# Patient Record
Sex: MALE | Race: WHITE | Employment: UNEMPLOYED | URBAN - METROPOLITAN AREA
[De-identification: names, ages, dates, MRNs, and addresses within clinical notes are randomized per-mention and may not be internally consistent; named-entity substitution may affect disease eponyms.]

---

## 2019-03-10 ENCOUNTER — TRANSFERRED RECORDS (OUTPATIENT)
Dept: HEALTH INFORMATION MANAGEMENT | Facility: CLINIC | Age: 54
End: 2019-03-10

## 2019-03-19 ENCOUNTER — OFFICE VISIT (OUTPATIENT)
Dept: NEUROLOGY | Facility: CLINIC | Age: 54
End: 2019-03-19
Payer: COMMERCIAL

## 2019-03-19 ENCOUNTER — ALLIED HEALTH/NURSE VISIT (OUTPATIENT)
Dept: NEUROLOGY | Facility: CLINIC | Age: 54
End: 2019-03-19
Payer: COMMERCIAL

## 2019-03-19 ENCOUNTER — TRANSFERRED RECORDS (OUTPATIENT)
Dept: HEALTH INFORMATION MANAGEMENT | Facility: CLINIC | Age: 54
End: 2019-03-19

## 2019-03-19 VITALS
TEMPERATURE: 98.2 F | SYSTOLIC BLOOD PRESSURE: 136 MMHG | WEIGHT: 226 LBS | HEART RATE: 71 BPM | DIASTOLIC BLOOD PRESSURE: 88 MMHG

## 2019-03-19 DIAGNOSIS — G40.909 EPILEPTIC SEIZURE (H): Primary | ICD-10-CM

## 2019-03-19 DIAGNOSIS — G40.219 PARTIAL SYMPTOMATIC EPILEPSY WITH COMPLEX PARTIAL SEIZURES, INTRACTABLE, WITHOUT STATUS EPILEPTICUS (H): Primary | ICD-10-CM

## 2019-03-19 RX ORDER — MELOXICAM 7.5 MG/1
7.5 TABLET ORAL
Status: ON HOLD | COMMUNITY
Start: 2019-01-22 | End: 2019-03-21

## 2019-03-19 RX ORDER — LAMOTRIGINE 100 MG/1
TABLET ORAL
COMMUNITY
Start: 2018-10-09 | End: 2019-11-26

## 2019-03-19 RX ORDER — LEVETIRACETAM 500 MG/1
750 TABLET ORAL 2 TIMES DAILY
Status: ON HOLD | COMMUNITY
Start: 2018-04-24 | End: 2019-03-28

## 2019-03-19 ASSESSMENT — PAIN SCALES - GENERAL: PAINLEVEL: SEVERE PAIN (7)

## 2019-03-19 NOTE — PROCEDURES
CPT 52620-87  OP/3hr Video EEG  [facility]
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
## 2019-03-19 NOTE — PATIENT INSTRUCTIONS
Please review sleep apnea machine with Dr. Lobito Young MD. Untreated sleep apnea can cause memory issues, early morning headaches.     Slowly reduce caffeine intake, if you drink too much caffeine it can cause seizure and be harmful to your heart.   Make small changes every two months, focus on one small change every month. Drink last caffeine drink at 5 pm.     Wear eye mask to reduce TV light at night, to improve sleep.      In the hospital we will check labs for sleepiness, EEG testing to look for seizures, and MRI brain     Follow up  Call with Hector in 1 month      Charity Young MD
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
## 2019-03-19 NOTE — PROGRESS NOTES
Called an left message to call [facility] about his EEG. There was no answer. Charity Young MD 3/19/2019 at 5 pm  Reviewed his EEG on 3/20/2019 with patient and advised him not to drive. Charity Young MD
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
## 2019-03-20 ENCOUNTER — ALLIED HEALTH/NURSE VISIT (OUTPATIENT)
Dept: NEUROLOGY | Facility: CLINIC | Age: 54
End: 2019-03-20
Payer: COMMERCIAL

## 2019-03-20 ENCOUNTER — OFFICE VISIT (OUTPATIENT)
Dept: NEUROLOGY | Facility: CLINIC | Age: 54
End: 2019-03-20
Payer: COMMERCIAL

## 2019-03-20 DIAGNOSIS — R41.3 MEMORY LOSS: ICD-10-CM

## 2019-03-20 DIAGNOSIS — F09 MENTAL DISORDER DUE TO GENERAL MEDICAL CONDITION: ICD-10-CM

## 2019-03-20 DIAGNOSIS — G40.919 EPILEPSY WITH ALTERED CONSCIOUSNESS WITH INTRACTABLE EPILEPSY (H): Primary | ICD-10-CM

## 2019-03-20 DIAGNOSIS — G40.219 PARTIAL SYMPTOMATIC EPILEPSY WITH COMPLEX PARTIAL SEIZURES, INTRACTABLE, WITHOUT STATUS EPILEPTICUS (H): Primary | ICD-10-CM

## 2019-03-20 NOTE — PROGRESS NOTES
"Derrell Salguero comes into clinic today at the request of Dr. Young Ordering Provider for General/Baseline/Admission.    This service provided today was under the supervising provider of the day Dr. Young, who was available if needed.  Debra Pruett RNCC    Care Coordinator Visit    Name:  Derrell Salguero   Date:    3/20/2019   :   1965   MRN:  [id number]     Time Spent:  Face-to-face time 55 minutes  See scanned [employer] checklist regarding videos viewed and handouts provided.     I met with the patient  after seizure videos were viewed, as listed in the checklist.     IMPRESSION:   1.  Epilepsy, unspecified.   2.  Memory loss, etiology unclear.       DISCUSSION:  The patient presents with declining memory over the last several years.  He is not having obvious clinical seizures at night.  However, early in the morning, wife notices that he appears to have a post-seizure \"look about him. \"  It would be helpful to do video EEG monitoring to evaluate seizure burden to determine if he has subclinical seizures, which may gradually worsen his seizures and his memory over several years.  Additionally, the patient does not use a pillbox, so this certainly raises the concern if he misses his medications due to his poor memory.  His wife works and the patient is responsible for his meds.  It would be helpful to have our nurses talk to him about medication compliance by using a pillbox and phone alarm.        Additionally, for memory deficits, we should complete basic tests including vitamin D, TSH, testosterone, MMA, vitamin B12, and anti-seizure medication levels.  We should also look at hemoglobin A1c as he feels sometimes his blood sugar drops and he gets hypoglycemic.  We should repeat MRI of the brain since patient is having memory deficit issues and lastly, we will admit him to the hospital to understand interictal and ictal burden if he has subclinical seizures.  During this admission, on the " first night, I would recommend not decreasing his anti-seizure medications.  We may sleep deprive him and then see if he has spontaneous partial seizures.      PLAN:   1.  Admit to the hospital for video EEG monitoring.  On day 1, do not reduce seizure medications just sleep deprive him.   2.  Neuropsychological testing to evaluate memory.  There is concern he may have subclinical seizures resulting in memory decline.   3.  MRI of the brain.   4.  Nurse education on hospital admission.   5.  If patient is found to have a high seizure burden in the hospital, consideration should be given to optimizing levetiracetam and lamotrigine.  We may add on a third agent if need be.  Consideration may be given to lamotrigine, Neurontin, I would avoid topiramate and zonisamide because he has a history of kidney stones, Gabitril and Felbamate.   6.  Follow up with Dr. Young after hospital discharge.     The  What Do You Know About Epilepsy  questionnaire was reviewed with the patient.  Areas focused on were what to do if a medication dose is missed, the difference between generalized and partial seizures and healthy lifestyle choices.  We also reviewed all questions that were answered incorrectly.    Basic introduction to epilepsy was done, including the difference between epileptic and nonepileptic events (including physiologic and psychogenic nonepileptic events) and the difference between partial onset and generalized onset epileptic seizures.  We discussed appropriate treatment for each of these and discussed why an accurate diagnosis is important.      We also discussed the importance of adequate sleep and good sleep hygiene, maintaining a healthy diet, taking a multivitamin, and getting exercise.  We discussed that the consumption of alcohol will affect how the body metabolizes medications and that binge drinking can cause withdrawal seizures.  We discussed stress and illness and how these can affect seizure control.  We  discussed how over-the counter diet aids, energy drinks, caffeine, and some herbal supplements can affect seizure control.  We also discussed taking showers instead of baths and keeping the shower drain clear so as not to allow water to pool.  We discussed driving. The patient understands that he is responsible for knowing and understanding his  state driving laws as well as for reporting any loss of contact or voluntary control to the Department of Motor Vehicles.  Derrell  also understands the liabilities and risks related to driving.    Introduction to [facility] was performed, including hospital policies, how seizures are induced, hygiene breaks, and the importance of staff assistance whenever  he  gets out of bed.  Derrell is aware that the length of stay is generally five days, but that this is dependent on what is captured on EEG.     During our general education session Derrell asked many appropriate questions, which were answered to  his satisfaction.
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
## 2019-03-20 NOTE — PROCEDURES
Procedure Date: 03/19/2019      EEG #:  [id number].        Three-hour EEG on 03/19/2019.      SOURCE FILE DURATION:  2 hours 54 minutes.      PATIENT INFORMATION:  This is a 53-year-old male with a history of epilepsy who presents with increased memory loss.  EEG is being done to evaluate for seizures.      MEDICATIONS:  Lamotrigine 250 mg twice a day and levetiracetam 750 mg twice a day.      TECHNICAL SUMMARY: This video EEG monitoring procedure was performed with 23 scalp electrodes in 10-20 system placements, and additional scalp, precordial and other surface electrodes used for electrical referencing and artifact detection. Video was reviewed intermittently by EEG technologist and physician for electroclinical seizures.     BACKGROUND ACTIVITY:  During wakefulness, the background activity consists of synchronous and symmetric, well modulated, 9 Hz posterior dominant rhythm. The posterior dominant rhythm attenuated with eye opening. During drowsiness, the background activity waxed and waned and there were periods of slowing and attenuation of the posterior alpha rhythm. Stage I sleep and Stage II sleep was recorded in which synchronous and symmetrical vertex waves , K-complexes and sleep spindles  were identified. After seizures there is right temporal chain postictal delta-theta slowing and asymmetric sleep architecture with decreased amplitude noted in the right hemisphere.  Good examples of this is at 13:45 and the postictal slowing is best seen at 13:42, maximally involving the frontotemporal region.      EPILEPTIFORM DISCHARGES:  The patient has epileptiform discharges in the right frontal region with maximum negativity at F8 followed by T4.  Good examples of these discharges are at 13:30.  Preceding his seizures, he does have an increased burden of epileptiform discharges seen at 13:31.  This builds up to an electrographic seizure at 13:38.  In rare instances, we do see sharp waves in the left temporal  "region.  A good example of this is at 13:42:11.  Maximum negativity seen at T3.      ICTAL:  The patient had 3 electroclinical seizures on this day.  All 3 seizures arose from sleep and clinically the times of the seizure are 13:38, 14:28 and 15:27.  The seizures arise out of sleep and the patient electrographically has right temporal lobe theta ictal discharge that lasts under 1 minute.  Onset is best seen in the right temporal chain with a rhythmic theta discharge that evolves to a higher voltage delta discharge with propagation to the right parasagittal chain and the midline electrodes.  A well-formed left hemispheric seizure was not seen.  Postictally, there is right frontotemporal slowing.  Clinically, the seizures at 13:33 and 14:28, the patient is sleeping.  He does have some mouth movements and appears to be clenching his jaw.  There was no obvious hand automatisms or motor movements identified on the video.  The best seizure testing is at 15:27.  Again, this seizure arose out of sleep.  The patient was promptly tested and he was able to follow commands and identify his name.  The patient stated after the seizure, he had a tingling feeling in his head.  Postictally, the patient was not able to recall the commands he was given.  He was able to read and when asked what he felt during that particular spell, he states he felt tingling in his head and sometimes all over.  He is not able to identify how long it lasted.  When asked if he had a seizure, he states \"I have no idea.\"  When asked when was his last seizure, he states \"a long time ago.\"      IMPRESSION:  Awake and sleep EEG is abnormal due to the presence of 3 electroclinical seizures.  Clinically, seizure arose out of sleep, he reported a sensation of tingling in his head/body, and had oral automatisms. He was able to follow commands and had no obvious impairment in awareness. Additionally, the interictal EEG does have right frontotemporal lobe " epileptiform discharges with focal postictal right frontotemporal slowing.  In rare instances, we do see left temporal lobe voltage epileptiform discharges. EEG is consistent with increased cortical irritability in bitemporal region (right greater than left) and post ictal focal right temporal lobe cortical dysfunction. EEG is diagnostic for a right temporal lobe epilepsy.   Clinical correlation is advised.         JAMIE DELGADILLO MD             D: 2019   T: 2019   MT: vern      Name:     MARIPOSA ANDERSEN   MRN:      [id number]        Account:        [id number]   :      1965           Procedure Date: 2019      Document: [id number]

## 2019-03-20 NOTE — PROGRESS NOTES
Patient was seen for neuropsychological evaluation at the request of Dr. Charity Young, for the purposes of diagnostic clarification and treatment planning.  2 hrs 49 min of test administration and scoring were provided by this writer, Debra Choudhury.  Please see Dr. Rafael Clayton's report for a full interpretation of the findings.

## 2019-03-21 ENCOUNTER — ALLIED HEALTH/NURSE VISIT (OUTPATIENT)
Dept: NEUROLOGY | Facility: CLINIC | Age: 54
End: 2019-03-21
Attending: PSYCHIATRY & NEUROLOGY
Payer: COMMERCIAL

## 2019-03-21 ENCOUNTER — HOSPITAL ENCOUNTER (INPATIENT)
Facility: CLINIC | Age: 54
LOS: 8 days | Discharge: HOME | End: 2019-03-29
Attending: PSYCHIATRY & NEUROLOGY | Admitting: PSYCHIATRY & NEUROLOGY
Payer: COMMERCIAL

## 2019-03-21 DIAGNOSIS — F41.9 ANXIETY: ICD-10-CM

## 2019-03-21 DIAGNOSIS — G40.219 PARTIAL SYMPTOMATIC EPILEPSY WITH COMPLEX PARTIAL SEIZURES, INTRACTABLE, WITHOUT STATUS EPILEPTICUS (H): Primary | ICD-10-CM

## 2019-03-21 DIAGNOSIS — G47.9 SLEEP DIFFICULTIES: ICD-10-CM

## 2019-03-21 LAB
ALBUMIN SERPL-MCNC: 3.8 G/DL (ref 3.4–5)
ALP SERPL-CCNC: 102 U/L (ref 40–150)
ALT SERPL W P-5'-P-CCNC: 22 U/L (ref 0–70)
ANION GAP SERPL CALCULATED.3IONS-SCNC: 5 MMOL/L (ref 3–14)
AST SERPL W P-5'-P-CCNC: 11 U/L (ref 0–45)
BILIRUB SERPL-MCNC: 0.9 MG/DL (ref 0.2–1.3)
BUN SERPL-MCNC: 18 MG/DL (ref 7–30)
CALCIUM SERPL-MCNC: 9.7 MG/DL (ref 8.5–10.1)
CHLORIDE SERPL-SCNC: 107 MMOL/L (ref 94–109)
CO2 SERPL-SCNC: 30 MMOL/L (ref 20–32)
CREAT SERPL-MCNC: 1.44 MG/DL (ref 0.66–1.25)
ERYTHROCYTE [DISTWIDTH] IN BLOOD BY AUTOMATED COUNT: 12.6 % (ref 10–15)
GFR SERPL CREATININE-BSD FRML MDRD: 54 ML/MIN/{1.73_M2}
GLUCOSE SERPL-MCNC: 88 MG/DL (ref 70–99)
HCT VFR BLD AUTO: 45.5 % (ref 40–53)
HGB BLD-MCNC: 14.2 G/DL (ref 13.3–17.7)
MCH RBC QN AUTO: 29.8 PG (ref 26.5–33)
MCHC RBC AUTO-ENTMCNC: 31.2 G/DL (ref 31.5–36.5)
MCV RBC AUTO: 96 FL (ref 78–100)
PLATELET # BLD AUTO: 265 10E9/L (ref 150–450)
POTASSIUM SERPL-SCNC: 4.8 MMOL/L (ref 3.4–5.3)
PROT SERPL-MCNC: 7.1 G/DL (ref 6.8–8.8)
RBC # BLD AUTO: 4.76 10E12/L (ref 4.4–5.9)
SODIUM SERPL-SCNC: 143 MMOL/L (ref 133–144)
WBC # BLD AUTO: 6 10E9/L (ref 4–11)

## 2019-03-21 PROCEDURE — 40000141 ZZH STATISTIC PERIPHERAL IV START W/O US GUIDANCE

## 2019-03-21 PROCEDURE — 85027 COMPLETE CBC AUTOMATED: CPT | Performed by: PHYSICIAN ASSISTANT

## 2019-03-21 PROCEDURE — 80175 DRUG SCREEN QUAN LAMOTRIGINE: CPT | Performed by: PHYSICIAN ASSISTANT

## 2019-03-21 PROCEDURE — 95951 ZZHC EEG VIDEO EACH 24 HR: CPT | Mod: ZF

## 2019-03-21 PROCEDURE — 36415 COLL VENOUS BLD VENIPUNCTURE: CPT | Performed by: PHYSICIAN ASSISTANT

## 2019-03-21 PROCEDURE — 80177 DRUG SCRN QUAN LEVETIRACETAM: CPT | Performed by: PHYSICIAN ASSISTANT

## 2019-03-21 PROCEDURE — 12000001 ZZH R&B MED SURG/OB UMMC

## 2019-03-21 PROCEDURE — 25000132 ZZH RX MED GY IP 250 OP 250 PS 637: Performed by: PHYSICIAN ASSISTANT

## 2019-03-21 PROCEDURE — 40000802 ZZH SITE CHECK

## 2019-03-21 PROCEDURE — 80053 COMPREHEN METABOLIC PANEL: CPT | Performed by: PHYSICIAN ASSISTANT

## 2019-03-21 PROCEDURE — 95951 ZZHC EEG VIDEO < 12 HR: CPT | Mod: 52,ZF

## 2019-03-21 RX ORDER — GINSENG 100 MG
CAPSULE ORAL 3 TIMES DAILY PRN
Status: DISCONTINUED | OUTPATIENT
Start: 2019-03-21 | End: 2019-03-29 | Stop reason: HOSPADM

## 2019-03-21 RX ORDER — LIDOCAINE 40 MG/G
CREAM TOPICAL
Status: DISCONTINUED | OUTPATIENT
Start: 2019-03-21 | End: 2019-03-29 | Stop reason: HOSPADM

## 2019-03-21 RX ORDER — DOCUSATE SODIUM 100 MG/1
100 CAPSULE, LIQUID FILLED ORAL 2 TIMES DAILY PRN
Status: DISCONTINUED | OUTPATIENT
Start: 2019-03-21 | End: 2019-03-29 | Stop reason: HOSPADM

## 2019-03-21 RX ORDER — ACETAMINOPHEN 325 MG/1
650 TABLET ORAL EVERY 4 HOURS PRN
Status: DISCONTINUED | OUTPATIENT
Start: 2019-03-21 | End: 2019-03-29 | Stop reason: HOSPADM

## 2019-03-21 RX ORDER — LORAZEPAM 2 MG/ML
2 INJECTION INTRAMUSCULAR
Status: DISCONTINUED | OUTPATIENT
Start: 2019-03-21 | End: 2019-03-29 | Stop reason: HOSPADM

## 2019-03-21 RX ORDER — IBUPROFEN 200 MG
200 TABLET ORAL EVERY 6 HOURS PRN
Status: DISCONTINUED | OUTPATIENT
Start: 2019-03-21 | End: 2019-03-29 | Stop reason: HOSPADM

## 2019-03-21 RX ORDER — LEVETIRACETAM 750 MG/1
750 TABLET ORAL 2 TIMES DAILY
Status: DISCONTINUED | OUTPATIENT
Start: 2019-03-21 | End: 2019-03-22

## 2019-03-21 RX ADMIN — LEVETIRACETAM 750 MG: 750 TABLET, FILM COATED ORAL at 21:28

## 2019-03-21 RX ADMIN — ACETAMINOPHEN 650 MG: 325 TABLET, FILM COATED ORAL at 21:32

## 2019-03-21 RX ADMIN — LAMOTRIGINE 250 MG: 150 TABLET ORAL at 21:28

## 2019-03-21 RX ADMIN — OMEPRAZOLE 20 MG: 20 CAPSULE, DELAYED RELEASE ORAL at 16:46

## 2019-03-21 ASSESSMENT — ACTIVITIES OF DAILY LIVING (ADL)
ADLS_ACUITY_SCORE: 20
ADLS_ACUITY_SCORE: 18

## 2019-03-21 ASSESSMENT — VISUAL ACUITY
OU: NORMAL ACUITY
OU: NORMAL ACUITY
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
## 2019-03-22 ENCOUNTER — ALLIED HEALTH/NURSE VISIT (OUTPATIENT)
Dept: NEUROLOGY | Facility: CLINIC | Age: 54
End: 2019-03-22
Attending: PSYCHIATRY & NEUROLOGY
Payer: COMMERCIAL

## 2019-03-22 DIAGNOSIS — G40.219 PARTIAL SYMPTOMATIC EPILEPSY WITH COMPLEX PARTIAL SEIZURES, INTRACTABLE, WITHOUT STATUS EPILEPTICUS (H): Primary | ICD-10-CM

## 2019-03-22 LAB
LAMOTRIGINE SERPL-MCNC: 6.3 UG/ML (ref 2.5–15)
LEVETIRACETAM SERPL-MCNC: 16 UG/ML (ref 12–46)

## 2019-03-22 PROCEDURE — 25000132 ZZH RX MED GY IP 250 OP 250 PS 637: Performed by: PHYSICIAN ASSISTANT

## 2019-03-22 PROCEDURE — 25000132 ZZH RX MED GY IP 250 OP 250 PS 637: Performed by: STUDENT IN AN ORGANIZED HEALTH CARE EDUCATION/TRAINING PROGRAM

## 2019-03-22 PROCEDURE — 95951 ZZHC EEG VIDEO EACH 24 HR: CPT | Mod: ZF

## 2019-03-22 PROCEDURE — 12000001 ZZH R&B MED SURG/OB UMMC

## 2019-03-22 RX ORDER — LANOLIN ALCOHOL/MO/W.PET/CERES
3 CREAM (GRAM) TOPICAL
Status: DISCONTINUED | OUTPATIENT
Start: 2019-03-22 | End: 2019-03-29 | Stop reason: HOSPADM

## 2019-03-22 RX ORDER — LEVETIRACETAM 500 MG/1
500 TABLET ORAL 2 TIMES DAILY
Status: DISCONTINUED | OUTPATIENT
Start: 2019-03-22 | End: 2019-03-24

## 2019-03-22 RX ADMIN — LEVETIRACETAM 500 MG: 500 TABLET, FILM COATED ORAL at 20:22

## 2019-03-22 RX ADMIN — LAMOTRIGINE 250 MG: 150 TABLET ORAL at 20:22

## 2019-03-22 RX ADMIN — LEVETIRACETAM 750 MG: 750 TABLET, FILM COATED ORAL at 08:05

## 2019-03-22 RX ADMIN — OMEPRAZOLE 20 MG: 20 CAPSULE, DELAYED RELEASE ORAL at 06:42

## 2019-03-22 RX ADMIN — LAMOTRIGINE 250 MG: 150 TABLET ORAL at 08:05

## 2019-03-22 RX ADMIN — MELATONIN TAB 3 MG 3 MG: 3 TAB at 21:27

## 2019-03-22 RX ADMIN — OMEPRAZOLE 20 MG: 20 CAPSULE, DELAYED RELEASE ORAL at 15:50

## 2019-03-22 ASSESSMENT — ACTIVITIES OF DAILY LIVING (ADL)
COGNITION: 0 - NO COGNITION ISSUES REPORTED
NUMBER_OF_TIMES_PATIENT_HAS_FALLEN_WITHIN_LAST_SIX_MONTHS: 1
SWALLOWING: 0-->SWALLOWS FOODS/LIQUIDS WITHOUT DIFFICULTY
ADLS_ACUITY_SCORE: 15
FALL_HISTORY_WITHIN_LAST_SIX_MONTHS: YES
AMBULATION: 0-->INDEPENDENT
BATHING: 0-->INDEPENDENT
ADLS_ACUITY_SCORE: 20
TRANSFERRING: 0-->INDEPENDENT
ADLS_ACUITY_SCORE: 19
ADLS_ACUITY_SCORE: 14
ADLS_ACUITY_SCORE: 19
WHICH_OF_THE_ABOVE_FUNCTIONAL_RISKS_HAD_A_RECENT_ONSET_OR_CHANGE?: AMBULATION
TOILETING: 0-->INDEPENDENT
ADLS_ACUITY_SCORE: 19
RETIRED_EATING: 0-->INDEPENDENT
RETIRED_COMMUNICATION: 0-->UNDERSTANDS/COMMUNICATES WITHOUT DIFFICULTY
DRESS: 0-->INDEPENDENT

## 2019-03-22 ASSESSMENT — VISUAL ACUITY: OU: NORMAL ACUITY
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
## 2019-03-22 NOTE — PROGRESS NOTES
[facility], [place]   Epilepsy Service Daily Note      Interval History:   Had one episode of tingling around eyes yesterday. No major complaints.     Review of System:   No nausea, No vomiting, no headaches, no dizziness, no chest pain.     Medications:   Antiepileptic Medications Home Doses: levetiracetam 750-750, lamotrigine 250-250  Antiepileptic Medications Current Doses:  levetiracetam 750-750, lamotrigine 250-250    Exam: Blood pressure 101/67, temperature 97.9  F (36.6  C), temperature source Oral, resp. rate 18, SpO2 98 %.   General: NAD  Neuro: Alert and oriented. Speech fluent. Hearing intact to normal conversation. EOM's intact. Face symmetric.     EEG: right temporal sharps     Assessment and Plan:   1. Patient is a 53 year old, right-handed male with a history of crohn's and focal epilepsy who is being admitted for quantification of seizures and further medication management. One target seizure recorded. There was no associated EEG correlate but scalp negative focal unimpaired seizure can not be ruled out.     -continue vEEG monitoring  -decrease levetiracetam to 500-500    Candice Fernandez PA-C    Type of target event identified: event with no loss of awareness, convulsion   Number of events: more needed, 1  Discharge medication plan: To be decided  Further Imaging studies needed prior to discharge: No imaging required prior to discharge  Discharge transportation: not discussed  Other pertinent issues/goals for discharge: not at this time       Total time: 15 minute was spent in the care of this patient. The patient agrees with the above mentioned plan of care. I answered all the patient's questions and addressed immediate concerns. More than 50% of time spent consisted of counseling and coordinating care, including discussion of the diagnostic significance of EEG findings, anti-seizure medication management, and planning for discharge home

## 2019-03-22 NOTE — PLAN OF CARE
Status: On 6A for sz monitoring   VS: VSS on RA  Neuros: A&Ox4. Intact.    GI: Tolerating regular diet. No BM this shift, passing gas  : Voiding w/out difficulty  IV: PIV SL  Activity: SBA w/gb. Walked on unit x3  Pain: Denies  Skin: EEG leads intact, compliant w/sz precautions  Labs/Tests: Event x1 of face tingling, Neuro Epilepsy team aware, no interventions ordered  Social: No visitors this shift  Plan of care: Continue to monitor for sz activity
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
## 2019-03-23 ENCOUNTER — ALLIED HEALTH/NURSE VISIT (OUTPATIENT)
Dept: NEUROLOGY | Facility: CLINIC | Age: 54
End: 2019-03-23
Attending: PSYCHIATRY & NEUROLOGY
Payer: COMMERCIAL

## 2019-03-23 DIAGNOSIS — R56.9 SEIZURES (H): Primary | ICD-10-CM

## 2019-03-23 PROCEDURE — 40000141 ZZH STATISTIC PERIPHERAL IV START W/O US GUIDANCE

## 2019-03-23 PROCEDURE — 95951 ZZHC EEG VIDEO EACH 24 HR: CPT | Mod: ZF

## 2019-03-23 PROCEDURE — 12000001 ZZH R&B MED SURG/OB UMMC

## 2019-03-23 PROCEDURE — 25000132 ZZH RX MED GY IP 250 OP 250 PS 637: Performed by: STUDENT IN AN ORGANIZED HEALTH CARE EDUCATION/TRAINING PROGRAM

## 2019-03-23 PROCEDURE — 25000132 ZZH RX MED GY IP 250 OP 250 PS 637: Performed by: PHYSICIAN ASSISTANT

## 2019-03-23 RX ADMIN — LAMOTRIGINE 250 MG: 150 TABLET ORAL at 20:10

## 2019-03-23 RX ADMIN — ACETAMINOPHEN 650 MG: 325 TABLET, FILM COATED ORAL at 23:44

## 2019-03-23 RX ADMIN — ACETAMINOPHEN 650 MG: 325 TABLET, FILM COATED ORAL at 02:00

## 2019-03-23 RX ADMIN — OMEPRAZOLE 20 MG: 20 CAPSULE, DELAYED RELEASE ORAL at 16:37

## 2019-03-23 RX ADMIN — MELATONIN TAB 3 MG 3 MG: 3 TAB at 23:44

## 2019-03-23 RX ADMIN — OMEPRAZOLE 20 MG: 20 CAPSULE, DELAYED RELEASE ORAL at 07:42

## 2019-03-23 RX ADMIN — LEVETIRACETAM 500 MG: 500 TABLET, FILM COATED ORAL at 07:42

## 2019-03-23 RX ADMIN — LAMOTRIGINE 250 MG: 150 TABLET ORAL at 07:42

## 2019-03-23 ASSESSMENT — ACTIVITIES OF DAILY LIVING (ADL)
ADLS_ACUITY_SCORE: 14

## 2019-03-23 NOTE — PLAN OF CARE
Status: On 6A for sz monitoring   VS: VSS on RA  Neuros: A&Ox4. Intact.    GI: Tolerating regular diet. No BM this shift, passing gas  : Voiding w/out difficulty  IV: PIV SL  Activity: SBA w/gb. Walked on unit x3  Pain: Denies  Skin: EEG leads intact, compliant w/sz precautions  Labs/Tests: Event x1 of face tingling, ROAR completed, see note  Social: No visitors this shift  Plan of care: Continue to monitor for sz activity

## 2019-03-23 NOTE — PLAN OF CARE
Time/length of seizure event: GEO  Movements (head, eyes, extremities): None  Orientation during seizure: Ax4  After seizure, remembers the unique phrase given during seizure: No, short term mem. Def.  After seizure, remembers an unnamed visual object shown during seizure: Yes  Able to identify/name aloud item during seizure: Yes  Able to follow command during seizure: Yes  Able to read test sentence aloud during seizure: Yes  Able to read test sentence aloud again after the seizure: Yes  After seizure, remembers name of object shown during seizure: Yes  Orientation and level of consciousness after seizure: Ax4  VS and oxygen saturation during/after seizure: GEO, VS not taken  Recall of the event?: GEO  Was this a typical seizure/event?: Yes  Presence of aura or pre-seizure activity: No  Incontinence: No    Pt states he has forehead tingling.

## 2019-03-23 NOTE — PROGRESS NOTES
Epilepsy Service Progress Note   Interval History:   The patient had a few episodes of eye tingling. He did not have any stronger symptoms. Slept well last night.    Physical Exam:   /70 (BP Location: Right arm)   Temp 97.1  F (36.2  C) (Oral)   Resp 18   SpO2 96%   Alert and awake, NAD, EOMI, face symmetric, normal FNF, normal strength.    Home AEDs: levetiracetam 750-750, lamotrigine 250-250  Current AEDs: levetiracetam 500-500, lamotrigine 250-250    Current Facility-Administered Medications   Medication     acetaminophen (TYLENOL) tablet 650 mg     bacitracin ointment     docusate sodium (COLACE) capsule 100 mg     ibuprofen (ADVIL/MOTRIN) tablet 200 mg     lamoTRIgine (LaMICtal) tablet 250 mg     levETIRAcetam (KEPPRA) tablet 500 mg     lidocaine (LMX4) cream     lidocaine 1 % 0.1-1 mL     lidocaine VISCOUS (XYLOCAINE) 2 % solution 5 mL     LORazepam (ATIVAN) injection 2 mg     magnesium hydroxide (MILK OF MAGNESIA) suspension 30 mL     melatonin tablet 3 mg     nicotine (NICODERM CQ) 7 MG/24HR 24 hr patch 1 patch     nicotine Patch in Place     nicotine patch REMOVAL     omeprazole (priLOSEC) CR capsule 20 mg     sodium chloride (PF) 0.9% PF flush 3 mL     sodium chloride (PF) 0.9% PF flush 3 mL     Assessment:      Patient is a 53 year old, right-handed male with a history of crohn's and focal epilepsy who is being admitted for quantification of seizures and further medication management. The patient had a few episodes with eye tingling with no abnormal EEG correlate. Could be scalp negative focal unimpaired aware seizures.     Plan:   - Continue video EEG monitoring to capture a target event.  - discontinue Keppra  - Seizure/fall precautions      Fernanda Doshi MD

## 2019-03-23 NOTE — PLAN OF CARE
Status: Pt on 6A for VEEG monitoring for characterization of events.   VS: /70 (BP Location: Right arm)   Temp 97.1  F (36.2  C) (Oral)   Resp 18   SpO2 96%   Neuros: Neuros intact  GI: Tolerates regular diet. No BM this shift.   : Voids spontaneously.   IV: PIV SL.   Activity: Up with SBA. Compliant with seizure precautions.   Pain: Denies pain.   Respiratory: LS clear, equal throughout.   Drains/lines/skin: Skin intact.   Labs/Tests: Continue with EEG.   New this shift: No events witnessed or reported this shift.   Plan of care: Continue to monitor and follow current POC.

## 2019-03-24 ENCOUNTER — ALLIED HEALTH/NURSE VISIT (OUTPATIENT)
Dept: NEUROLOGY | Facility: CLINIC | Age: 54
End: 2019-03-24
Attending: PSYCHIATRY & NEUROLOGY
Payer: COMMERCIAL

## 2019-03-24 DIAGNOSIS — G40.219 PARTIAL SYMPTOMATIC EPILEPSY WITH COMPLEX PARTIAL SEIZURES, INTRACTABLE, WITHOUT STATUS EPILEPTICUS (H): Primary | ICD-10-CM

## 2019-03-24 PROCEDURE — 12000001 ZZH R&B MED SURG/OB UMMC

## 2019-03-24 PROCEDURE — 25000132 ZZH RX MED GY IP 250 OP 250 PS 637: Performed by: PSYCHIATRY & NEUROLOGY

## 2019-03-24 PROCEDURE — 25000132 ZZH RX MED GY IP 250 OP 250 PS 637: Performed by: PHYSICIAN ASSISTANT

## 2019-03-24 PROCEDURE — 25000132 ZZH RX MED GY IP 250 OP 250 PS 637: Performed by: STUDENT IN AN ORGANIZED HEALTH CARE EDUCATION/TRAINING PROGRAM

## 2019-03-24 RX ORDER — LAMOTRIGINE 200 MG/1
200 TABLET ORAL 2 TIMES DAILY
Status: DISCONTINUED | OUTPATIENT
Start: 2019-03-24 | End: 2019-03-26

## 2019-03-24 RX ADMIN — MELATONIN TAB 3 MG 3 MG: 3 TAB at 23:44

## 2019-03-24 RX ADMIN — OMEPRAZOLE 20 MG: 20 CAPSULE, DELAYED RELEASE ORAL at 16:24

## 2019-03-24 RX ADMIN — OMEPRAZOLE 20 MG: 20 CAPSULE, DELAYED RELEASE ORAL at 07:41

## 2019-03-24 RX ADMIN — LAMOTRIGINE 200 MG: 200 TABLET ORAL at 20:00

## 2019-03-24 RX ADMIN — LAMOTRIGINE 250 MG: 150 TABLET ORAL at 07:41

## 2019-03-24 RX ADMIN — IBUPROFEN 200 MG: 200 TABLET, FILM COATED ORAL at 19:07

## 2019-03-24 ASSESSMENT — ACTIVITIES OF DAILY LIVING (ADL)
ADLS_ACUITY_SCORE: 14

## 2019-03-24 NOTE — PLAN OF CARE
Status: On 6A for sz monitoring   VS: VSS on RA  Neuros: A&Ox4. Intact.    GI: Tolerating regular diet. BM x1 today, passing gas  : Voiding w/out difficulty  IV: PIV SL  Activity: SBA w/gb. Walked on unit x3  Pain: Denies  Skin: EEG leads intact, hygiene break today, compliant w/sz precautions  Labs/Tests: Mild face tingling this AM, Neuro Ep. Team aware  Social: No visitors this shift  Plan of care: VPM ordered d/t pts tendency to have szs when sleeping. Continue to monitor for sz activity

## 2019-03-24 NOTE — PLAN OF CARE
Writer assumed cares for pt [id number]. No significant changes at this time. Pt alert and oriented. Vitally stable on ra. Denies pain. Standby assist with gait belt use when ambulating. Continues with continuous EEG monitoring; no s/s of seizure activity. Up in chair using smart phone. PIV SL. Able to make needs know. Call light within reach. Continue to follow poc.
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
## 2019-03-24 NOTE — PROGRESS NOTES
Epilepsy Service Progress Note   Interval History:   The patient had 3 events with tingling around both eyes. This morning he had it all morning and the feeling gradually got stronger. Now he has a sharp pain in his neck on the left.     Physical Exam:   BP 98/56 (BP Location: Right arm)   Temp 96.5  F (35.8  C) (Oral)   Resp 16   SpO2 97%   Alert and awake, NAD, no aphasia or dysarthria, EOMI, face symmetric, normal strength.    Home AEDs: levetiracetam 750-750, lamotrigine 250-250  Current AEDs: levetiracetam 500-500, lamotrigine 250-250    Current Facility-Administered Medications   Medication     acetaminophen (TYLENOL) tablet 650 mg     bacitracin ointment     docusate sodium (COLACE) capsule 100 mg     ibuprofen (ADVIL/MOTRIN) tablet 200 mg     lamoTRIgine (LaMICtal) tablet 200 mg     lidocaine (LMX4) cream     lidocaine 1 % 0.1-1 mL     lidocaine VISCOUS (XYLOCAINE) 2 % solution 5 mL     LORazepam (ATIVAN) injection 2 mg     magnesium hydroxide (MILK OF MAGNESIA) suspension 30 mL     melatonin tablet 3 mg     nicotine (NICODERM CQ) 7 MG/24HR 24 hr patch 1 patch     nicotine Patch in Place     nicotine patch REMOVAL     omeprazole (priLOSEC) CR capsule 20 mg     sodium chloride (PF) 0.9% PF flush 3 mL     sodium chloride (PF) 0.9% PF flush 3 mL     Assessment:      Derrell Salguero is a 53 year old, right-handed male with a history of crohn's and focal epilepsy who is being admitted for quantification of seizures and further medication management. The patient had a few episodes with eye tingling with no abnormal EEG correlate. Could be scalp negative focal unimpaired aware seizures. 3 seizures were captured during the vEEG in the clinic. They were out of sleep but the patient states that when he woke up he felt the tingling around his eyes. He is taking LVT (16) and LMT (6.3). The option of optimizing levetiracetam to see it helps with the tingling feeling and monitoring for couple days before discharge  or staying to capture a seizures was given to the patient. After discussing with his wife, he decided to stay a couple more days to capture his seizure. It was discussed that we cannot guaranty preventing a GTC seizure if we lower his medications more. He understands.     Plan:   - Decrease lamotrigine to 200 mg bid  - Continue vEEG monitoring to capture seizures   - Seizure/fall precautions.  - Ativan 2 mg prn for GTC seizure.      Fernanda Doshi MD

## 2019-03-24 NOTE — PLAN OF CARE
Status: Pt on 6A for VEEG monitoring for characterization of events.   VS: /66 (BP Location: Right arm)   Temp 97.6  F (36.4  C) (Oral)   Resp 16   SpO2 98%   Neuros: Neuros intact. C/o of ringing in ears last evening.  GI: Tolerates regular diet. No BM this shift.   : Voids spontaneously.   IV: PIV SL.   Activity: Up with SBA. Compliant with seizure precautions.   Pain: Denies pain.   Respiratory: LS clear, equal throughout.   Drains/lines/skin: Skin intact.   Labs/Tests: Continue with EEG.   New this shift: No events witnessed or reported this shift.   Plan of care: Continue to monitor and follow current POC.
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
## 2019-03-25 ENCOUNTER — ALLIED HEALTH/NURSE VISIT (OUTPATIENT)
Dept: NEUROLOGY | Facility: CLINIC | Age: 54
End: 2019-03-25
Attending: PSYCHIATRY & NEUROLOGY
Payer: COMMERCIAL

## 2019-03-25 DIAGNOSIS — G40.219 PARTIAL SYMPTOMATIC EPILEPSY WITH COMPLEX PARTIAL SEIZURES, INTRACTABLE, WITHOUT STATUS EPILEPTICUS (H): Primary | ICD-10-CM

## 2019-03-25 PROCEDURE — 25000132 ZZH RX MED GY IP 250 OP 250 PS 637: Performed by: PSYCHIATRY & NEUROLOGY

## 2019-03-25 PROCEDURE — 95951 ZZHC EEG VIDEO EACH 24 HR: CPT | Mod: ZF

## 2019-03-25 PROCEDURE — 12000001 ZZH R&B MED SURG/OB UMMC

## 2019-03-25 PROCEDURE — 25000132 ZZH RX MED GY IP 250 OP 250 PS 637: Performed by: PHYSICIAN ASSISTANT

## 2019-03-25 RX ORDER — OXCARBAZEPINE 150 MG/1
150 TABLET, FILM COATED ORAL 2 TIMES DAILY
Status: DISCONTINUED | OUTPATIENT
Start: 2019-03-25 | End: 2019-03-26

## 2019-03-25 RX ADMIN — OXCARBAZEPINE 150 MG: 150 TABLET ORAL at 12:42

## 2019-03-25 RX ADMIN — LAMOTRIGINE 200 MG: 200 TABLET ORAL at 07:50

## 2019-03-25 RX ADMIN — OMEPRAZOLE 20 MG: 20 CAPSULE, DELAYED RELEASE ORAL at 16:10

## 2019-03-25 RX ADMIN — LAMOTRIGINE 200 MG: 200 TABLET ORAL at 20:10

## 2019-03-25 RX ADMIN — OXCARBAZEPINE 150 MG: 150 TABLET ORAL at 20:10

## 2019-03-25 RX ADMIN — OMEPRAZOLE 20 MG: 20 CAPSULE, DELAYED RELEASE ORAL at 07:50

## 2019-03-25 ASSESSMENT — ACTIVITIES OF DAILY LIVING (ADL)
ADLS_ACUITY_SCORE: 14

## 2019-03-25 NOTE — PLAN OF CARE
/70 (BP Location: Left arm)   Temp 97.2  F (36.2  C) (Oral)   Resp 16   SpO2 97%   [id number]: A/Ox4, VSS on RA. Neuros intact ex tingling to forehead. Tolerating diet. Up with Ax1 and GB. Voiding spontaneously. VPM in place for safety. Plan to monitor for seizure events overnight. Will continue POC.

## 2019-03-25 NOTE — PROGRESS NOTES
_ Orientation            Time          __-1___        Place        ___2____        Personal Info.     ____4____          __WAIS-IV   FSIQ____VCI_____PRI_____ WMI__PSI____       Raw  Age SS  __Similarities  __23___ __9___  __Vocabulary  _______ _______  __Information  _______          _______  __Comprehension _______ _______  __Block Design  __28__  __7__  __Matrix Reas.  __17__  __10__  __Visual Puzzles _______ _______  __Picture Comp. _______ _______  __Figure Weights _______ _______  __Digit Span  __22___ __7__   __Arithmetic  __ 17_                __12___  __L-N Sequencing _______ _______  __Symbol Search _____  _____  __Coding  __46__  __6___  __Cancellation  _______ _______          HVLT-R  Trial   1 2    3     Total                  5             9              7                21                                                 Raw  T  Immediate Recall            21                         34      Delayed Recall                  5                        23      Retention (%)                    56                      ?20                          Rec/Dis Inc.  # Hits      12      #FPs     6                ?20                BVMT-R  Trial   1   2     3       Total                  2               4             5               11                                               Raw             T            Immediate Recall            11              26           Delayed Recall                   5               31           Retention (%)                    100          >16%  Rec/Dis Inc.  # Hits       4     #FPs     1              1-2%    __Rey-Osterreith/Awa Complex Figure Test    Raw  T-score        %ile  Copy   _25.5__         -      ?1    Recognition __411 __           -           __WRAT-4   Reading SS = 85   %ile = 16    GE = 9.6    __COWAT   Raw__24__ Tscore__34___   __Semantic Fluency/Animals   Raw = 19  Tscore = 48  __BNT   Raw = 49/60 TScore = 34  __Complex Ideational Material   Raw = 10/12 Tscore =  34    __Trail Making Test   A =   23         Errors = 0    TScore = 55   B =  83        Errors = 0    TScore = 44  __Stroop                       Raw + Lesa    =  Total       TScore          Word = _68_       8        =     76           34__          Color =  _46_      4        =     50        __30__        C/W   =  _31_      5        =     36        __41__    __Benton Facial Discrimination   Raw = 45 Interp: WNL    __Grooved Pegboard   RH = 95  Tscore = 33 Drops = 1   LH = 95  TScore = 37 Drops = 2    __BDI-II   Raw_4___ Interp.__Minimal  __BAI     Raw _5__ Interp. __Minimal___    __WMS-III   LM I = 17         SS = 3   LM II = 7         SS = 5   LM II R = 19         z = -1.77    Faces I = 24         SS = 5   Faces II = 23         SS = 4   SS = 15                        SS = 11    __TOMM   Trial 1 = 40  Trial 2 = 49
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
## 2019-03-25 NOTE — PLAN OF CARE
Status: Pt on 6A for VEEG monitoring for characterization of events.   VS: /61 (BP Location: Left arm)   Pulse 67   Temp 97.4  F (36.3  C) (Oral)   Resp 16   SpO2 95%   Neuros: Neuros intact  GI: Tolerates regular diet. No BM this shift.   : Voids spontaneously.   IV: PIV SL.   Activity: Up with SBA. Compliant with seizure precautions.   Pain: Denies pain.   Respiratory: LS clear, equal throughout.   Drains/lines/skin: Skin intact.   Labs/Tests: Continue with EEG.   New this shift: No events witnessed or reported this shift. Denies forehead tingling.  Social: Slept well after receiving melatonin. No family members at bedside this shift.   Plan of care: Continue to monitor and follow current POC.

## 2019-03-25 NOTE — PLAN OF CARE
"Status: Pt on 6A for VEEG monitoring for characterization of events.   VSS  Neuros: Neuros intact  GI: Tolerates regular diet. No BM this shift.   : Voids spontaneously.   IV: PIV SL.   Activity: Up with SBA. Walked in [place] with RN x2. Compliant with seizure precautions.   Pain: Denies pain.   Respiratory: LS clear, equal throughout.   Drains/lines/skin: Skin intact.   Labs/Tests: Continue with EEG.   New this shift: One episode of forehead tingling when walking in [place]. At approx 1150 am he stated he \"had that feeling a couple minutes ago.\" Candice from Neurology was in [place] also at this time and spoke with pt. Feeling had resolved, no formal ROAR completed.  Social: Son here to visit.   Plan of care: Continue to monitor and follow current POC.        "

## 2019-03-25 NOTE — PROGRESS NOTES
[facility], [place]   Epilepsy Service Daily Note      Interval History:   Mild episodes of tingling around eyes yesterday and today. None have been as strong as what he had during his 3-hour EEG in clinic.     Review of System:   No nausea, No vomiting, no headaches, no dizziness, no chest pain.     Medications:   Antiepileptic Medications Home Doses: levetiracetam 750-750, lamotrigine 250-250  Antiepileptic Medications Current Doses: lamotrigine 200-200    Exam: Blood pressure 114/61, pulse 62, temperature 97.5  F (36.4  C), temperature source Oral, resp. rate 16, SpO2 100 %.   General: NAD  Neuro: Alert and oriented. Speech fluent. Hearing intact to normal conversation. EOM's intact. Face symmetric. No drift or pronation. Intact FNF. Gait normal.     EEG: right temporal epileptiform discharges    Assessment and Plan:   1. Patient is a 53 year old, right-handed male with a history of crohn's and focal epilepsy who is being admitted for quantification of seizures and further medication management. Multiple target seizures recorded. There was no associated EEG correlate but scalp negative focal unimpaired seizures can not be ruled out.     -continue vEEG monitoring  -continue lamotrigine 200-200  -start oxcarbazepine 150-150      Candice Fernandez PA-C    Type of target event identified: event with no loss of awareness, convulsion   Number of events: adequate number   Discharge medication plan: To be decided  Further Imaging studies needed prior to discharge: No imaging required prior to discharge  Discharge transportation: not discussed  Other pertinent issues/goals for discharge: not at this time       Total time: 15 minute was spent in the care of this patient. The patient agrees with the above mentioned plan of care. I answered all the patient's questions and addressed immediate concerns. More than 50% of time spent consisted of counseling and coordinating care, including discussion of  the diagnostic significance of EEG findings, anti-seizure medication management, and planning for discharge home

## 2019-03-25 NOTE — PROCEDURES
Procedure Date: 03/24/2019      EEG #-4       DATE OF RECORDING/SERVICE DATE:  03/24/2019       SOURCE FILE DURATION:  22:13:25      CLINICAL HISTORY:  This is day #4 of video EEG monitoring in a 53-year-old male who has had possible seizures since his 20s and is here for further seizure characterization.  Video EEG was started for evaluation of seizure.      MEDICATIONS EMPLOYED DURING RECORDING:  Lamictal 250 mg b.i.d., which was discontinued at 12:34 and decreased to 200 mg b.i.d.  Keppra 500 mg daily, which was discontinued at 12:34.  Melatonin 3 mg at bedtime.      TECHNICAL SUMMARY: This continuous video- EEG monitoring procedure was performed with 23 scalp electrodes in 10-20 electrode system placement, and additional scalp, precordial and other surface electrodes used for electrical referencing and artifact detection.  Video monitoring was utilized and periodically reviewed by EEG technologists and the physician for electroclinical correlations.    INTERICTAL EEG ACTIVITIES:  During maximal waking, there is a symmetric posterior dominant rhythm that attenuates with eye opening and has frequencies ranging between 8.5 and 9.5 Hz.  There is intermittent subtle focal delta slowing of the right temporal region.  During drowsiness, there is increased generalized theta in the central and paracentral regions occurring in bursts, as well as intermittent dropout of the posterior dominant rhythm.  During stage II sleep, there are symmetric sleep spindles, vertex transients, and K complexes.  During deeper states of sleep, there is generalized delta slowing of a moderate amplitude.       ACTIVATION PROCEDURES:  Hyperventilation is performed on this day with increased amounts of generalized theta being present, but otherwise, no asymmetries noted.        EPILEPTIFORM ACTIVITIES:  There are rare epileptiform discharges with maxima reversal seen at T4 during drowsiness and stage II sleep.  No clinical behaviors are  associated with these discharges.  Good examples can be seen at 23:04:47, 00:28:34, and 00:22:27.      ICTAL ACTIVITIES:  None.      IMPRESSION:  Abnormal.  There is focal slowing of the right temporal  Region, and epileptiform discharges in the right temporal region.  Findings are consistent for a right temporal cerebral dysfunction and right temporal cortical irritability with increased seizure tendency.  No electrographic or clinical seizures were recorded on this day.      This report is dictated by Talon Ahuja DO, CNP Fellow.       This report will be reviewed by epilepsy staff/supervisor:  Myranda Obrien MD         Dictated By:  Talon Ahuja DO.  Clinical Neurophysiology Fellow  I agree with the findings as reported.  I personally reviewed this EEG recording.  Myranda Obrien M.D Ph.D              D: 2019   T: 2019   MT: FLIP      Name:     MAIRPOSA ANDERSEN   MRN:      [id number]        Account:        [id number]   :      1965           Procedure Date: 2019      Document: [id number]

## 2019-03-26 ENCOUNTER — ALLIED HEALTH/NURSE VISIT (OUTPATIENT)
Dept: NEUROLOGY | Facility: CLINIC | Age: 54
End: 2019-03-26
Attending: PSYCHIATRY & NEUROLOGY
Payer: COMMERCIAL

## 2019-03-26 DIAGNOSIS — G40.219 PARTIAL SYMPTOMATIC EPILEPSY WITH COMPLEX PARTIAL SEIZURES, INTRACTABLE, WITHOUT STATUS EPILEPTICUS (H): Primary | ICD-10-CM

## 2019-03-26 PROCEDURE — 95951 ZZHC EEG VIDEO EACH 24 HR: CPT | Mod: ZF

## 2019-03-26 PROCEDURE — 12000001 ZZH R&B MED SURG/OB UMMC

## 2019-03-26 PROCEDURE — 25000132 ZZH RX MED GY IP 250 OP 250 PS 637: Performed by: PSYCHIATRY & NEUROLOGY

## 2019-03-26 PROCEDURE — 25000132 ZZH RX MED GY IP 250 OP 250 PS 637: Performed by: PHYSICIAN ASSISTANT

## 2019-03-26 PROCEDURE — 25000132 ZZH RX MED GY IP 250 OP 250 PS 637: Performed by: STUDENT IN AN ORGANIZED HEALTH CARE EDUCATION/TRAINING PROGRAM

## 2019-03-26 RX ORDER — OXCARBAZEPINE 300 MG/1
300 TABLET, FILM COATED ORAL 2 TIMES DAILY
Status: DISCONTINUED | OUTPATIENT
Start: 2019-03-26 | End: 2019-03-28

## 2019-03-26 RX ADMIN — OXCARBAZEPINE 300 MG: 300 TABLET, FILM COATED ORAL at 20:41

## 2019-03-26 RX ADMIN — MELATONIN TAB 3 MG 3 MG: 3 TAB at 21:24

## 2019-03-26 RX ADMIN — OXCARBAZEPINE 150 MG: 150 TABLET ORAL at 08:40

## 2019-03-26 RX ADMIN — LAMOTRIGINE 200 MG: 200 TABLET ORAL at 08:38

## 2019-03-26 RX ADMIN — ACETAMINOPHEN 650 MG: 325 TABLET, FILM COATED ORAL at 21:24

## 2019-03-26 RX ADMIN — LAMOTRIGINE 250 MG: 150 TABLET ORAL at 20:41

## 2019-03-26 RX ADMIN — MELATONIN TAB 3 MG 3 MG: 3 TAB at 00:09

## 2019-03-26 RX ADMIN — OMEPRAZOLE 20 MG: 20 CAPSULE, DELAYED RELEASE ORAL at 15:52

## 2019-03-26 RX ADMIN — OMEPRAZOLE 20 MG: 20 CAPSULE, DELAYED RELEASE ORAL at 08:40

## 2019-03-26 ASSESSMENT — ACTIVITIES OF DAILY LIVING (ADL)
ADLS_ACUITY_SCORE: 14

## 2019-03-26 NOTE — PLAN OF CARE
Shift: [id number]  Admitted for VEEG monitoring for characterization of events.    Neuro: Neuro assessment unchanged - intact.  VS: AVSS. Afebrile. RA.  Pain: Denies pain.  GI/: Regular diet. Voids spontaneously - unmeasured.  LDA's: R PIV SL.  Skin: Intact.  Activity: SBA with gait belt - walked in hallway at bedtime.  Did experience R forearm twitching/spasming starting around 2200 - patient pushed event button so everyone was aware in case any activity was happening at the time, however, button was not working appropriately (FYI). Patient instructed to press call light to inform staff if he felt any seizure-like activity until event button is functional.      Plan: No acute issues overnight. Continue EEG monitoring. Contact primary team with any changes, questions or concerns.

## 2019-03-26 NOTE — PROGRESS NOTES
[facility], [place]   Epilepsy Service Daily Note      Interval History:   Has continued with mild episodes of tingling. A seizure was noted over night. He did push the event button. Another seizure was noted out of sleep at 1026 this morning, patient pushed the button about 5 minutes after seizure reporting stronger tingling.     Review of System:   Muscle twitch in right forearm that started last night. No nausea, No vomiting, no headaches, no dizziness, no chest pain.     Medications:   Antiepileptic Medications Home Doses: levetiracetam 750-750, lamotrigine 250-250  Antiepileptic Medications Current Doses: lamotrigine 200-200, oxcarbazepine 150-150    Exam: Blood pressure 109/63, pulse 62, temperature 96.9  F (36.1  C), temperature source Oral, resp. rate 16, SpO2 99 %.   General: NAD  Neuro: Alert and oriented. Speech fluent. Hearing intact to normal conversation. Pupils equal, round and reactive to light. EOM's intact. Face symmetric. No drift or pronation. Intact FNF.     EEG: right temporal epileptiform discharges    Assessment and Plan:   1. Patient is a 53 year old, right-handed male with a history of crohn's and focal epilepsy who is being admitted for quantification of seizures and further medication management. Multiple target seizures recorded. There was no associated EEG correlate but scalp negative focal unimpaired seizures can not be ruled out.     -continue vEEG monitoring  -increase lamotrigine back to -250  -increase oxcarbazepine to 300-300  -anticipate discharge Friday       Candice Fernandez PA-C    Type of target event identified: event with no loss of awareness, convulsion   Number of events: adequate number   Discharge medication plan: To be decided  Further Imaging studies needed prior to discharge: No imaging required prior to discharge  Discharge transportation: not discussed  Other pertinent issues/goals for discharge: medication management       Total  time: 15 minute was spent in the care of this patient. The patient agrees with the above mentioned plan of care. I answered all the patient's questions and addressed immediate concerns. More than 50% of time spent consisted of counseling and coordinating care, including discussion of the diagnostic significance of EEG findings, anti-seizure medication management, and planning for discharge home

## 2019-03-26 NOTE — PLAN OF CARE
Status: Pt on 6A for VEEG monitoring for characterization of events.   VSS  Neuros: Neuros intact  GI: Tolerates regular diet. No BM this shift.   : Voids spontaneously.   IV: PIV SL.   Activity: Up with SBA. Compliant with seizure precautions.   Pain: Denies pain.   Respiratory: LS clear, equal throughout.   Drains/lines/skin: Skin intact.   Labs/Tests: Continue with EEG.   New this shift: One episode of nausea this am, pt unsure if this may be an aura and has no other reason he would have an upset stomach. A short time later he states he felt right arm tingling. He states that he was asked by MD if he remembered pushing the event button. He told him at the time that he did not. He wants it known that he does not think he pushed the button and that it may have been the time the tech was testing the button (it had not worked overnight.)  Social: Several visitors this afternoon.  Plan of care: Continue to monitor and follow current POC. Trileptal and Lamictal increased for HS dose tonight.

## 2019-03-26 NOTE — PROGRESS NOTES
Upon editing study, pt had a sz out of sleep @ 10:26, lasting approx 90 sec. Pt alerted briefly, than remained calm on R side. No other clinical symptoms noted. Pt falls back asleep after.
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
## 2019-03-27 ENCOUNTER — ALLIED HEALTH/NURSE VISIT (OUTPATIENT)
Dept: NEUROLOGY | Facility: CLINIC | Age: 54
End: 2019-03-27
Attending: PSYCHIATRY & NEUROLOGY
Payer: COMMERCIAL

## 2019-03-27 DIAGNOSIS — G40.219 PARTIAL SYMPTOMATIC EPILEPSY WITH COMPLEX PARTIAL SEIZURES, INTRACTABLE, WITHOUT STATUS EPILEPTICUS (H): Primary | ICD-10-CM

## 2019-03-27 PROCEDURE — 25000132 ZZH RX MED GY IP 250 OP 250 PS 637: Performed by: STUDENT IN AN ORGANIZED HEALTH CARE EDUCATION/TRAINING PROGRAM

## 2019-03-27 PROCEDURE — 95951 ZZHC EEG VIDEO EACH 24 HR: CPT | Mod: ZF

## 2019-03-27 PROCEDURE — 25000132 ZZH RX MED GY IP 250 OP 250 PS 637: Performed by: PHYSICIAN ASSISTANT

## 2019-03-27 PROCEDURE — 40000141 ZZH STATISTIC PERIPHERAL IV START W/O US GUIDANCE

## 2019-03-27 PROCEDURE — 25000128 H RX IP 250 OP 636: Performed by: PHYSICIAN ASSISTANT

## 2019-03-27 PROCEDURE — 12000001 ZZH R&B MED SURG/OB UMMC

## 2019-03-27 RX ADMIN — OMEPRAZOLE 20 MG: 20 CAPSULE, DELAYED RELEASE ORAL at 07:33

## 2019-03-27 RX ADMIN — MELATONIN TAB 3 MG 3 MG: 3 TAB at 22:53

## 2019-03-27 RX ADMIN — LAMOTRIGINE 250 MG: 150 TABLET ORAL at 07:33

## 2019-03-27 RX ADMIN — OXCARBAZEPINE 300 MG: 300 TABLET, FILM COATED ORAL at 19:48

## 2019-03-27 RX ADMIN — LAMOTRIGINE 250 MG: 150 TABLET ORAL at 19:48

## 2019-03-27 RX ADMIN — OXCARBAZEPINE 300 MG: 300 TABLET, FILM COATED ORAL at 07:33

## 2019-03-27 RX ADMIN — LORAZEPAM 2 MG: 2 INJECTION INTRAMUSCULAR; INTRAVENOUS at 13:18

## 2019-03-27 RX ADMIN — IBUPROFEN 200 MG: 200 TABLET, FILM COATED ORAL at 01:02

## 2019-03-27 RX ADMIN — OMEPRAZOLE 20 MG: 20 CAPSULE, DELAYED RELEASE ORAL at 16:45

## 2019-03-27 ASSESSMENT — ACTIVITIES OF DAILY LIVING (ADL)
ADLS_ACUITY_SCORE: 12
ADLS_ACUITY_SCORE: 14
ADLS_ACUITY_SCORE: 12

## 2019-03-27 NOTE — PLAN OF CARE
[id number]: Admitted for EEG monitoring. VSS on RA. A&Ox4. One event observed by EEG techs, patient did not press the button but did state he was about to because his event woke him up. A&Ox4 throughout the event. VPM did not notice any change in patient activity. Strengths equal throughout. Patient complained of R) sided tingling during event. Up SBA. Voiding adequately. PIV SL. Regular diet. Possibly discharge tomorrow. Lamictal and Trilepta increased today. PRN Tylenol and Melatonin at HS.  Will continue to monitor and follow POC.

## 2019-03-27 NOTE — PLAN OF CARE
VSS.  PRN Ibuprofen given x 1 for frontal headache with some relief.  VEEG leads in place; no events witnessed or reported.  Seizure precautions and VPM in place; pt compliant.  Neuros intact.  PIV SL.  Tolerating regular diet.  Voiding spontaneously.  No BM this shift.  Up with SBA and GB.  Plan is for discharge home Friday.  Continue with POC.

## 2019-03-27 NOTE — PROGRESS NOTES
[facility], [place]   Epilepsy Service Daily Note      Interval History:   Had one seizure yesterday morning at 1026 consisting of stronger tingling. He had another last night around 2100. No problems tolerating increase in oxcarbazepine or lamotrigine. Continues with near constant tingling around eyes.     Review of System:   Muscle twitch in right forearm continuesNo nausea, No vomiting, no headaches, no dizziness, no chest pain.     Medications:   Antiepileptic Medications Home Doses: levetiracetam 750-750, lamotrigine 250-250  Antiepileptic Medications Current Doses: lamotrigine 250-250, oxcarbazepine 300-300    Exam: Blood pressure 111/64, pulse 62, temperature 96.8  F (36  C), temperature source Oral, resp. rate 16, SpO2 99 %.   General: NAD  Neuro: Alert and oriented. Speech fluent. Hearing intact to normal conversation. Pupils equal, round and reactive to light. EOM's intact. Face symmetric. No drift or pronation. Intact FNF.     EEG: right temporal epileptiform discharges    Assessment and Plan:   1. Patient is a 53 year old, right-handed male with a history of crohn's and focal epilepsy who is being admitted for quantification of seizures and further medication management. Multiple target seizures recorded. Seizures with stronger tingling are showing EEG correlate. Continued antiepileptic drug adjustments needed prior to discharge as seizures still occurring. If patient tolerating oxcarbazepine ok today, will consider increase tomorrow.     -continue vEEG monitoring  -continue PTA lamotrigine 250-250  -continue  oxcarbazepine to 300-300  -will give Ativan 2 mg now and see if this resolves mild tingling   -anticipate discharge Friday       Candice Fernandez PA-C    Type of target event identified: event with no loss of awareness, convulsion   Number of events: adequate number   Discharge medication plan: oxcarbazepine and lamotrigine   Further Imaging studies needed prior to  discharge: No imaging required prior to discharge  Discharge transportation: wife to provide   Other pertinent issues/goals for discharge: medication management to reduce seizures       Total time: 20 minute was spent in the care of this patient. The patient agrees with the above mentioned plan of care. I answered all the patient's questions and addressed immediate concerns. More than 50% of time spent consisted of counseling and coordinating care, including discussion of the diagnostic significance of EEG findings, anti-seizure medication management, and planning for discharge home
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
## 2019-03-27 NOTE — PLAN OF CARE
Status: On 6A for sz monitoring   VS: VSS on RA  Neuros: A&Ox4. Intact.    GI: Tolerating regular diet. No BM this shift, passing gas  : Voiding w/out difficulty  IV: PIV SL  Activity: SBA w/gb. Walked on unit x2 today  Pain: Denies  Skin: EEG leads intact, compliant w/sz precautions  Labs/Tests: No events this shift, ativan challenge given earlier today  Social: No visitors this shift  Plan of care: D/c scheduled for Friday. Continue to monitor for sz activity

## 2019-03-27 NOTE — PLAN OF CARE
Pt here for video-sz monitoring, vss, neuros include: a/o x4 and intact. PIV SL, regular diet, voiding spont, had BM last evening, up SBA w/GB. Pt received IV ativan for ativan challenge, O2 monitoring initiated ambulated hallways w/staff x1. BA/CA on @ all times for safety, no events were witnessed. Continued to monitor per MD orders.

## 2019-03-28 ENCOUNTER — ALLIED HEALTH/NURSE VISIT (OUTPATIENT)
Dept: NEUROLOGY | Facility: CLINIC | Age: 54
End: 2019-03-28
Attending: PSYCHIATRY & NEUROLOGY
Payer: COMMERCIAL

## 2019-03-28 DIAGNOSIS — G40.219 PARTIAL SYMPTOMATIC EPILEPSY WITH COMPLEX PARTIAL SEIZURES, INTRACTABLE, WITHOUT STATUS EPILEPTICUS (H): Primary | ICD-10-CM

## 2019-03-28 LAB — GLUCOSE BLDC GLUCOMTR-MCNC: 136 MG/DL (ref 70–99)

## 2019-03-28 PROCEDURE — 00000146 ZZHCL STATISTIC GLUCOSE BY METER IP

## 2019-03-28 PROCEDURE — 25000132 ZZH RX MED GY IP 250 OP 250 PS 637: Performed by: PHYSICIAN ASSISTANT

## 2019-03-28 PROCEDURE — 12000001 ZZH R&B MED SURG/OB UMMC

## 2019-03-28 PROCEDURE — 99221 1ST HOSP IP/OBS SF/LOW 40: CPT | Performed by: PSYCHIATRY & NEUROLOGY

## 2019-03-28 PROCEDURE — 95951 ZZHC EEG VIDEO EACH 24 HR: CPT | Mod: ZF

## 2019-03-28 PROCEDURE — 25000132 ZZH RX MED GY IP 250 OP 250 PS 637: Performed by: STUDENT IN AN ORGANIZED HEALTH CARE EDUCATION/TRAINING PROGRAM

## 2019-03-28 RX ORDER — OXCARBAZEPINE 150 MG/1
450 TABLET, FILM COATED ORAL 2 TIMES DAILY
Qty: 180 TABLET | Refills: 0 | Status: SHIPPED | OUTPATIENT
Start: 2019-03-28 | End: 2019-04-01

## 2019-03-28 RX ADMIN — LAMOTRIGINE 250 MG: 150 TABLET ORAL at 21:16

## 2019-03-28 RX ADMIN — MELATONIN TAB 3 MG 3 MG: 3 TAB at 23:09

## 2019-03-28 RX ADMIN — OMEPRAZOLE 20 MG: 20 CAPSULE, DELAYED RELEASE ORAL at 16:21

## 2019-03-28 RX ADMIN — LAMOTRIGINE 250 MG: 150 TABLET ORAL at 07:29

## 2019-03-28 RX ADMIN — OXCARBAZEPINE 300 MG: 300 TABLET, FILM COATED ORAL at 07:30

## 2019-03-28 RX ADMIN — OXCARBAZEPINE 450 MG: 300 TABLET, FILM COATED ORAL at 21:16

## 2019-03-28 RX ADMIN — OMEPRAZOLE 20 MG: 20 CAPSULE, DELAYED RELEASE ORAL at 07:30

## 2019-03-28 ASSESSMENT — ACTIVITIES OF DAILY LIVING (ADL)
ADLS_ACUITY_SCORE: 12
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
## 2019-03-28 NOTE — PROGRESS NOTES
CLINICAL NUTRITION SERVICES - BRIEF NOTE    Reviewed nutrition risk factors due to LOS. Pt is tolerating diet, eating well per nursing documentation; 100% PO. No nutrition issues identified at this time. RD will follow via rounds at this time, unless consulted.     Shai Ivy RD, LD, [facility]  Neuro ICU  Pager: [phone]

## 2019-03-28 NOTE — PLAN OF CARE
Pt was admitted for partial symptomatic epilepsy. His vitals were stable on room air. His neuros were stable as well with a/o x4, 5/5 strengths, no numbness/tingling. He is up with a stand by assist with a GB due to seizure precautions. He has been tolerating a regular diet. He has denied pain. His PIV is SL. His skin is intact. His lungs are clear bilaterally.

## 2019-03-28 NOTE — PLAN OF CARE
Status: Patient admitted for seizure monitoring, leads intact, no events witnessed or reported, VPM in place for nocturnal events ?  VS: VSS?  Neuros: Intact  GI: Regular diet, No BM  : Voiding spontaneously??  IV: New PIV placed, saline locked  Activity: Up with SBA  Pain: Denies  Respiratory/Trach: Lung sounds clear throughout  Skin: Intact  Social: Patient updating wife via telephone?  Plan of care: Discharge home tomorrow, continue to monitor and follow POC

## 2019-03-28 NOTE — PLAN OF CARE
Status: On 6A for sz monitoring   VS: VSS on RA  Neuros: A&Ox4. Intact.    GI: Tolerating regular diet. BM x1 this shift  : Voiding w/out difficulty  IV: PIV SL  Activity: SBA w/gb.   Pain: Denies  Skin: EEG leads intact, compliant w/sz precautions  Labs/Tests: No events this shift  Social: No visitors this shift  Plan of care: D/c scheduled for tomorrow (Friday). Continue to monitor for sz activity

## 2019-03-28 NOTE — PROGRESS NOTES
[facility], [place]   Epilepsy Service Daily Note      Interval History:   Patient reports tingling improved, but did not completely resolve with ativan. The muscle twitch in right arm has stopped. He has also been drinking more fluids. No seizures yesterday or this morning. Tolerating oxcarbazepine without problems. He does report that he is a chronic worrier and wonders if medication would help. He is interested in seeing psychiatry.     Review of System:   No nausea, No vomiting, no headaches, no dizziness, no chest pain.     Medications:   Antiepileptic Medications Home Doses: levetiracetam 750-750, lamotrigine 250-250  Antiepileptic Medications Current Doses: lamotrigine 250-250, oxcarbazepine 300-300    Exam: Blood pressure 102/70, pulse 65, temperature 96.8  F (36  C), temperature source Oral, resp. rate 16, SpO2 100 %.   General: NAD  Neuro: Alert and oriented. Speech fluent. Hearing intact to normal conversation. Pupils equal, round and reactive to light. EOM's intact. Face symmetric. No drift or pronation. Intact FNF.     EEG: right temporal epileptiform discharges    Assessment and Plan:   1. Patient is a 53 year old, right-handed male with a history of crohn's and focal epilepsy who is being admitted for quantification of seizures and further medication management. Multiple target seizures recorded. Seizures with stronger tingling are showing EEG correlate.     -continue vEEG monitoring  -continue PTA lamotrigine 250-250  -increase oxcarbazepine to 450-450  -psychiatry consult   -anticipate discharge tomorrow       Candice Fernandez PA-C    Type of target event identified: event with no loss of awareness, convulsion   Number of events: adequate number   Discharge medication plan: oxcarbazepine and lamotrigine   Further Imaging studies needed prior to discharge: No imaging required prior to discharge  Discharge transportation: wife to provide   Other pertinent issues/goals for  discharge: medication management to reduce seizures       Total time: 15 minute was spent in the care of this patient. The patient agrees with the above mentioned plan of care. I answered all the patient's questions and addressed immediate concerns. More than 50% of time spent consisted of counseling and coordinating care, including discussion of the diagnostic significance of EEG findings, anti-seizure medication management, and planning for discharge home

## 2019-03-28 NOTE — CONSULTS
"Consult Date:  03/28/2019      PSYCHIATRIC CONSULTATION      IDENTIFICATION:  Mr. Salguero is a 53-year-old white male who is currently hospitalized for evaluation of seizures.  I am asked to evaluate his anxiety by Candice Palacios PA-C      HISTORY OF PRESENT ILLNESS:  Mr. Salguero reports he has always been a very nervous person and has significant baseline anxiety.  He reports that his son was a substance abuser, and Mr. Salguero was often up all night looking for him.  This clearly increased his anxiety.  Currently, the son seems to be doing well.  Mr. Salguero reports he occasionally will get shortness of breath.  During that time he will have palpitations, butterflies in his stomach and panicky feelings.  He definitely has had more than 3 of these in 3 weeks.  In general, he has 1 or 2 a week.  Along with these panic attacks, he is often quite anxious when he is around people.  He can go to a movie theater or grocery store, but it makes him feel very uncomfortable.  So far, he has not been developing panic attacks in any specific place, so he has not yet developed any agoraphobia, but he does have very mild; social phobia, along with his panic disorder.      Part of this gentleman's problem is that he drinks about 8 Mountain Dews and 1 \"giant energy drink\" on a daily basis.  He realizes that this is a problem.  His wife has been trying to talk him into drinking more tea and less pop.  Today, I encouraged him to follow his wife's advice.  He really needs to cut down on his caffeine or anything we do to try to help his anxiety will likely fail.  A typical treatment for panic disorder or social phobia would be an SSRI.  We discussed the use of Lexapro.  However, he would prefer to first try to cut back on his caffeine and then have a medication that could be helpful to take the edge off in a panic situation.  We discussed the use of hydroxyzine.  I also think it would to be very beneficial for the patient to see a " "therapist.  He has had a lot going on that is quite anxiety provoking, and a CBT might be extremely helpful.      PAST MEDICAL HISTORY:  Focal epilepsy, Crohn's disease, obstructive sleep apnea, a history of kidney stones and kidney disease.      ALLERGIES:  NO KNOWN ALLERGIES.        FAMILY HISTORY:  His mother had Alzheimer's.  His son has significant substance use disorder.      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  He took regular classes in high school but had a very severe performance anxiety in high school.  He has been  for 24 years and has 2 children.  He does not abuse drugs or alcohol.  He smokes 1 cigarette per night with his wife before he goes to bed.  He also drinks very large quantities of caffeinated sugary beverages.      PHYSICAL REVIEW OF SYSTEMS:  The patient does have issues with poor memory and anxiety.  He also reports generally poor energy.  He denies headache or problems with vision or hearing, except he has tinnitus.  He has chest pain when he is \"worked up.\"  He does have a history of some cardiac abnormalities.  He denies abdominal pain, diarrhea, constipation, genitourinary symptoms or problems with muscles, skin or joints.      MENTAL STATUS EXAMINATION:  The patient was a pleasant, cooperative white male.  His mood was neutral.  His affect was full and appropriate.  His speech was coherent and goal oriented.  Associations tight.  Thought processes logical and linear.  Content of thought was without psychosis or suicidal ideation.  Recent and remote memory is reported to be impaired.  Fund of knowledge and use of language are within normal limits.  He is alert and oriented x 3.  Insight and judgment are intact.  Muscle strength and tone are at baseline.      ASSESSMENT:  Panic disorder with social phobia.      RECOMMENDATIONS:   1.  Hydroxyzine 50 mg p.o. daily for panic.   2.  Outpatient psychotherapy, hopefully CBT for " anxiety.   3.  The patient definitely needs to cut down on his caffeinated beverages, and he knows that.     4.  Finally if cutting down on caffeine, seeing a therapist and using a little Atarax does not work, he should probably go on an SSRI.  This could be started by his local physician.  At present he does not have a local family doctor, and I strongly encouraged him to get one.  Apparently, his own family physician recently retired.         RUDDY ACUNA MD             D: 2019   T: 2019   MT:       Name:     MARIPOSA ANDERSEN   MRN:      [id number]        Account:       [id number]   :      1965           Consult Date:  2019      Document: [id number]       cc: Candice GLORIA

## 2019-03-29 ENCOUNTER — ALLIED HEALTH/NURSE VISIT (OUTPATIENT)
Dept: NEUROLOGY | Facility: CLINIC | Age: 54
End: 2019-03-29
Attending: PSYCHIATRY & NEUROLOGY
Payer: COMMERCIAL

## 2019-03-29 VITALS
SYSTOLIC BLOOD PRESSURE: 107 MMHG | OXYGEN SATURATION: 96 % | DIASTOLIC BLOOD PRESSURE: 69 MMHG | HEART RATE: 69 BPM | RESPIRATION RATE: 16 BRPM | TEMPERATURE: 96.6 F

## 2019-03-29 DIAGNOSIS — F41.9 ANXIETY: ICD-10-CM

## 2019-03-29 DIAGNOSIS — G40.219 PARTIAL SYMPTOMATIC EPILEPSY WITH COMPLEX PARTIAL SEIZURES, INTRACTABLE, WITHOUT STATUS EPILEPTICUS (H): Primary | ICD-10-CM

## 2019-03-29 PROCEDURE — 95951 ZZHC EEG VIDEO < 12 HR: CPT | Mod: 52,ZF

## 2019-03-29 PROCEDURE — 25000132 ZZH RX MED GY IP 250 OP 250 PS 637: Performed by: PHYSICIAN ASSISTANT

## 2019-03-29 RX ORDER — LANOLIN ALCOHOL/MO/W.PET/CERES
3 CREAM (GRAM) TOPICAL
Qty: 30 TABLET | Refills: 3 | Status: SHIPPED | OUTPATIENT
Start: 2019-03-29

## 2019-03-29 RX ORDER — HYDROXYZINE PAMOATE 50 MG/1
50 CAPSULE ORAL DAILY PRN
Qty: 30 CAPSULE | Refills: 3 | Status: SHIPPED | OUTPATIENT
Start: 2019-03-29 | End: 2019-04-01

## 2019-03-29 RX ORDER — HYDROXYZINE PAMOATE 50 MG/1
50 CAPSULE ORAL DAILY PRN
Qty: 30 CAPSULE | Refills: 3 | OUTPATIENT
Start: 2019-03-29

## 2019-03-29 RX ADMIN — LAMOTRIGINE 250 MG: 150 TABLET ORAL at 09:02

## 2019-03-29 RX ADMIN — OMEPRAZOLE 20 MG: 20 CAPSULE, DELAYED RELEASE ORAL at 09:02

## 2019-03-29 RX ADMIN — OXCARBAZEPINE 450 MG: 300 TABLET, FILM COATED ORAL at 09:03

## 2019-03-29 ASSESSMENT — ACTIVITIES OF DAILY LIVING (ADL)
ADLS_ACUITY_SCORE: 12

## 2019-03-29 NOTE — PLAN OF CARE
AVSS, alert and oriented. Eager to go home, seen by team in am and all agree. EEG wires removed, tolerating regular diet and activity. Wife and other family here, discussed meds and follow up care and given discharge papers, questions answered. Chose to leave ambulatory, meds from here and also home pharmacy.
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
## 2019-03-29 NOTE — PLAN OF CARE
D AVSS with sat's 98% on room air. Heart regular and lungs clear. Patient has voiced no c/o pain or nausea during the night and slept well between cares. Noted no seizure activities and slept well.   I Vital's, assessment and med's per order.   A Resting in bed with call light in reach.   P Continue to monitor and update MD with changes .
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
## 2019-03-29 NOTE — TELEPHONE ENCOUNTER
Notes from pharmacy:    This drug is on back order as of right now. Is there something else the patient could take in the mean time?      Harsha Carlton MA on 3/29/2019 at 4:11 PM

## 2019-04-01 ENCOUNTER — PATIENT OUTREACH (OUTPATIENT)
Dept: CARE COORDINATION | Facility: CLINIC | Age: 54
End: 2019-04-01

## 2019-04-01 ENCOUNTER — PATIENT OUTREACH (OUTPATIENT)
Dept: NEUROLOGY | Facility: CLINIC | Age: 54
End: 2019-04-01

## 2019-04-01 DIAGNOSIS — G40.219 PARTIAL SYMPTOMATIC EPILEPSY WITH COMPLEX PARTIAL SEIZURES, INTRACTABLE, WITHOUT STATUS EPILEPTICUS (H): ICD-10-CM

## 2019-04-01 DIAGNOSIS — F41.9 ANXIETY: Primary | ICD-10-CM

## 2019-04-01 RX ORDER — OXCARBAZEPINE 150 MG/1
450 TABLET, FILM COATED ORAL 2 TIMES DAILY
Qty: 180 TABLET | Refills: 3 | Status: SHIPPED | OUTPATIENT
Start: 2019-04-01 | End: 2019-07-02

## 2019-04-01 RX ORDER — HYDROXYZINE HYDROCHLORIDE 50 MG/1
50 TABLET, FILM COATED ORAL DAILY PRN
Qty: 30 TABLET | Refills: 3 | Status: SHIPPED | OUTPATIENT
Start: 2019-04-01 | End: 2019-11-26

## 2019-04-01 NOTE — PROGRESS NOTES
"Information from discharge note:-    \"Patient Name:  Derrell Salguero  MRN:  [id number]      :  1965        Date of Admission:               3/21/2019  Date of Discharge::              3/29/2019  Admitting Physician:             Charity Young MD  Discharge Physician:             FAIZAN De La O, Dr. Obrien   Primary Care Provider:         No Ref-Primary, Physician  Discharge Disposition:         Discharged to home    Discharge Diagnoses:    1. Focal epilepsy  2. Crohn's disease  3. Obstructive sleep apnea, usually wears CPAP  4. History of kidney stones   5. Panic disorder with social phobia     Multiple seizures recorded.     Prior to discharge PTA lamotrigine was restarted and oxcarbazepine was initiated and he was discharged on 450-450. Levetiracetam was not restarted.\"      Copied from previous phone call:-  \"Patient states that he is still having tingling in his eyes bilaterally   Patient states he feels it is getting worse  He has not slept a full 8 hours he keeps waking up due to many different reasons but usually to go to the bathroom   He is not using his CPap machine yet- he did clean it and set it up he will try to start using it tonight     He is not sure if he should just wait a little for the medications to start working or if he should be concerned     Patient also states that the pharmacies in his area do don't have the Hydroxyzine in the capsule but do have the tablets  Are you ru to send in a prescription for tablets instead of capsules     Please call cell phone patient is at his father's today\"        Call placed to patient.  Patient does not feel he has had seizures, but has had the feeling of increased tingling sensation around his eyes..  He notes his sleep has not been very good since discharge, and that he has not used his CPAP, but will tonight. He has started melatonin.  He does not identify side effects of the medications.  He correctly noted his medications " including the new meds (oxcarbazepine)  He mentioned there is a issue with availability of hydroxyzine capsules and that the pharmacy was going to contact us about this.    Follow up reviewed with patient, he asked that the call could be changed to between 4.30 and 5pm because he will be at work and unavailable at 11.45am on 4/30/19    Discussed with patient that I will message  about the tingling, and call him back as appropriate, patient okay with this plan.    No other questions

## 2019-04-01 NOTE — PROGRESS NOTES
Patient states that he is still having tingling in his eyes bilaterally   Patient states he feels it is getting worse  He has not slept a full 8 hours he keeps waking up due to many different reasons but usually to go to the bathroom   He is not using his CPap machine yet- he did clean it and set it up he will try to start using it tonight    He is not sure if he should just wait a little for the medications to start working or if he should be concerned    Patient also states that the pharmacies in his area do don't have the Hydroxyzine in the capsule but do have the tablets  Are you ru to send in a prescription for tablets instead of capsules    Please call cell phone patient is at his father's today

## 2019-04-05 NOTE — PROGRESS NOTES
Message from  that the goal is to prevent the blackout spells, and if there are no blackouts he should continue with the med plan as initiated.  Nausea is no longer present, and he does not feel there have been any issues with loss of time / blackouts/  Overall patient feels he is doing better on this med that prior to admission    Will continue as planned and follow up with  as arranged.
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
## 2019-04-19 DIAGNOSIS — G40.219 PARTIAL SYMPTOMATIC EPILEPSY WITH COMPLEX PARTIAL SEIZURES, INTRACTABLE, WITHOUT STATUS EPILEPTICUS (H): ICD-10-CM

## 2019-04-19 LAB — SODIUM SERPL-SCNC: 141 MMOL/L (ref 133–144)

## 2019-04-19 PROCEDURE — 84295 ASSAY OF SERUM SODIUM: CPT | Performed by: PHYSICIAN ASSISTANT

## 2019-04-19 PROCEDURE — 80183 DRUG SCRN QUANT OXCARBAZEPIN: CPT | Mod: 90 | Performed by: PHYSICIAN ASSISTANT

## 2019-04-19 PROCEDURE — 99000 SPECIMEN HANDLING OFFICE-LAB: CPT | Performed by: PHYSICIAN ASSISTANT

## 2019-04-19 PROCEDURE — 36415 COLL VENOUS BLD VENIPUNCTURE: CPT | Performed by: PHYSICIAN ASSISTANT

## 2019-04-20 LAB — 10OH-CARBAZEPINE SERPL-MCNC: 15.9 UG/ML (ref 10–35)

## 2019-04-30 ENCOUNTER — VIRTUAL VISIT (OUTPATIENT)
Dept: NEUROLOGY | Facility: CLINIC | Age: 54
End: 2019-04-30
Payer: COMMERCIAL

## 2019-04-30 DIAGNOSIS — G40.919 EPILEPSY WITH ALTERED CONSCIOUSNESS WITH INTRACTABLE EPILEPSY (H): Primary | ICD-10-CM

## 2019-04-30 NOTE — PROGRESS NOTES
Call placed to patient.  We initially discussed that this was intended to be a call with , however at some point it was transferred to my schedule (Nursing).. I informed Derrell that I would get the MD call rearranged if he still desired - he would like this.      Medications reviewed. Patient reports good compliance.  Patient continues to feel he has had an improvement from prior to admission/med change    Will contact scheduling to arrange MD call  Instructed to call if further questions or concerns

## 2019-06-05 ENCOUNTER — VIRTUAL VISIT (OUTPATIENT)
Dept: NEUROLOGY | Facility: CLINIC | Age: 54
End: 2019-06-05
Payer: COMMERCIAL

## 2019-06-05 DIAGNOSIS — G40.919 EPILEPSY WITH ALTERED CONSCIOUSNESS WITH INTRACTABLE EPILEPSY (H): Primary | ICD-10-CM

## 2019-06-05 NOTE — PROGRESS NOTES
Telephone note    Lightheaded in the morning after taking dose of lamotrigine and oxcarbazepine.  He does not eat breakfast in the morning and takes his medications at 6:30 AM.  At night he takes his medications at 9 PM.  He has not had any seizures since his hospital admission in March 2019.  Overall he is happy with his care and had no concerns outside of the dizziness in the morning.    Current:   Lamotrigine 250 mg twice a day (6am and 9 pm)   Oxcarbazepine 450 mg twice a day     Plan:   I advised him to take lamotrigine and oxcarbazepine at 6:30 AM and 6:30 PM.  He should take his seizure medications after eating a meal.  This will help reduce AED side effect toxicity.  He will see come see us at our clinic in fall 2019.  I encouraged him to also follow-up with local neurologist Dr. Marilou Weinstein at [facility].    Time on phone was 5 minutes    CC:   [facility] at [facility]

## 2019-07-02 DIAGNOSIS — G40.219 PARTIAL SYMPTOMATIC EPILEPSY WITH COMPLEX PARTIAL SEIZURES, INTRACTABLE, WITHOUT STATUS EPILEPTICUS (H): Primary | ICD-10-CM

## 2019-07-02 RX ORDER — OXCARBAZEPINE 150 MG/1
TABLET, FILM COATED ORAL
Qty: 180 TABLET | Refills: 3 | Status: SHIPPED | OUTPATIENT
Start: 2019-07-02 | End: 2019-10-23

## 2019-10-05 ENCOUNTER — HEALTH MAINTENANCE LETTER (OUTPATIENT)
Age: 54
End: 2019-10-05

## 2019-10-23 DIAGNOSIS — G40.219 PARTIAL SYMPTOMATIC EPILEPSY WITH COMPLEX PARTIAL SEIZURES, INTRACTABLE, WITHOUT STATUS EPILEPTICUS (H): ICD-10-CM

## 2019-10-24 RX ORDER — OXCARBAZEPINE 150 MG/1
TABLET, FILM COATED ORAL
Qty: 540 TABLET | Refills: 0 | Status: SHIPPED | OUTPATIENT
Start: 2019-10-24 | End: 2019-11-26

## 2019-10-24 NOTE — TELEPHONE ENCOUNTER
Medication request meets [facility] Medication Refill Protocol - Seizure Medications (non-controlled) requirements.

## 2019-11-26 ENCOUNTER — OFFICE VISIT (OUTPATIENT)
Dept: NEUROLOGY | Facility: CLINIC | Age: 54
End: 2019-11-26
Payer: COMMERCIAL

## 2019-11-26 VITALS — HEART RATE: 57 BPM | DIASTOLIC BLOOD PRESSURE: 78 MMHG | SYSTOLIC BLOOD PRESSURE: 138 MMHG | WEIGHT: 226 LBS

## 2019-11-26 DIAGNOSIS — G40.219 PARTIAL SYMPTOMATIC EPILEPSY WITH COMPLEX PARTIAL SEIZURES, INTRACTABLE, WITHOUT STATUS EPILEPTICUS (H): Primary | ICD-10-CM

## 2019-11-26 DIAGNOSIS — R41.3 MEMORY LOSS: ICD-10-CM

## 2019-11-26 DIAGNOSIS — F41.9 ANXIETY: ICD-10-CM

## 2019-11-26 LAB
ALBUMIN SERPL-MCNC: 4.3 G/DL (ref 3.4–5)
ALP SERPL-CCNC: 138 U/L (ref 40–150)
ALT SERPL W P-5'-P-CCNC: 26 U/L (ref 0–70)
ANION GAP SERPL CALCULATED.3IONS-SCNC: 5 MMOL/L (ref 3–14)
AST SERPL W P-5'-P-CCNC: 10 U/L (ref 0–45)
BASOPHILS # BLD AUTO: 0 10E9/L (ref 0–0.2)
BASOPHILS NFR BLD AUTO: 0.5 %
BILIRUB SERPL-MCNC: 0.4 MG/DL (ref 0.2–1.3)
BUN SERPL-MCNC: 29 MG/DL (ref 7–30)
CALCIUM SERPL-MCNC: 10.3 MG/DL (ref 8.5–10.1)
CHLORIDE SERPL-SCNC: 106 MMOL/L (ref 94–109)
CO2 SERPL-SCNC: 30 MMOL/L (ref 20–32)
CREAT SERPL-MCNC: 1.35 MG/DL (ref 0.66–1.25)
DIFFERENTIAL METHOD BLD: ABNORMAL
EOSINOPHIL # BLD AUTO: 0.2 10E9/L (ref 0–0.7)
EOSINOPHIL NFR BLD AUTO: 2.9 %
ERYTHROCYTE [DISTWIDTH] IN BLOOD BY AUTOMATED COUNT: 12.8 % (ref 10–15)
GFR SERPL CREATININE-BSD FRML MDRD: 58 ML/MIN/{1.73_M2}
GLUCOSE SERPL-MCNC: 70 MG/DL (ref 70–99)
HCT VFR BLD AUTO: 48.7 % (ref 40–53)
HGB BLD-MCNC: 15.2 G/DL (ref 13.3–17.7)
IMM GRANULOCYTES # BLD: 0 10E9/L (ref 0–0.4)
IMM GRANULOCYTES NFR BLD: 0.3 %
LYMPHOCYTES # BLD AUTO: 2.1 10E9/L (ref 0.8–5.3)
LYMPHOCYTES NFR BLD AUTO: 31.8 %
MCH RBC QN AUTO: 29.3 PG (ref 26.5–33)
MCHC RBC AUTO-ENTMCNC: 31.2 G/DL (ref 31.5–36.5)
MCV RBC AUTO: 94 FL (ref 78–100)
MONOCYTES # BLD AUTO: 0.5 10E9/L (ref 0–1.3)
MONOCYTES NFR BLD AUTO: 7.3 %
NEUTROPHILS # BLD AUTO: 3.8 10E9/L (ref 1.6–8.3)
NEUTROPHILS NFR BLD AUTO: 57.2 %
NRBC # BLD AUTO: 0 10*3/UL
NRBC BLD AUTO-RTO: 0 /100
PLATELET # BLD AUTO: 307 10E9/L (ref 150–450)
POTASSIUM SERPL-SCNC: 5 MMOL/L (ref 3.4–5.3)
PROT SERPL-MCNC: 7 G/DL (ref 6.8–8.8)
RBC # BLD AUTO: 5.18 10E12/L (ref 4.4–5.9)
SODIUM SERPL-SCNC: 142 MMOL/L (ref 133–144)
WBC # BLD AUTO: 6.6 10E9/L (ref 4–11)

## 2019-11-26 RX ORDER — OXCARBAZEPINE 150 MG/1
TABLET, FILM COATED ORAL
Qty: 540 TABLET | Refills: 3 | Status: SHIPPED | OUTPATIENT
Start: 2019-11-26 | End: 2020-07-02

## 2019-11-26 RX ORDER — HYDROXYZINE HYDROCHLORIDE 50 MG/1
50 TABLET, FILM COATED ORAL DAILY PRN
Qty: 30 TABLET | Refills: 3 | Status: SHIPPED | OUTPATIENT
Start: 2019-11-26

## 2019-11-26 RX ORDER — LAMOTRIGINE 100 MG/1
TABLET ORAL
Qty: 450 TABLET | Refills: 3 | Status: SHIPPED | OUTPATIENT
Start: 2019-11-26 | End: 2020-07-02

## 2019-11-26 ASSESSMENT — PAIN SCALES - GENERAL: PAINLEVEL: SEVERE PAIN (6)
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
## 2019-11-26 NOTE — PATIENT INSTRUCTIONS
Try taking seizure medications three times a day to reduce side effects of dizzines and double vision:     Lamotrigine 200 mg morning, 200 mg noon, and 100 mg after dinner  Oxcarbazepine 300 mg three times a day     Mr. Salguero has  difficulty concentrating, easily distracted, has some memory issues.  He has clinical levels of depression/anxiety. If he continues to have difficulties with memory, routine use of a memory notebook or other assistive device could be of benefit. I have encouraged him to see a psychiatrist to address possible untreated depression/anxiety.     Psychiatry diagnosed panic disorder with social phobia. They recommended trying Hydroxyzine 50 mg as needed 1-2 times per day for panic attack, do not drive after taking medication.    Use sleep apnea machine daily, talk to your sleep about getting an easier strap so you are more comfortable.     Please see psychiatrist near home, ask primary care provider about best doctor to see    Charity Young MD

## 2019-11-28 LAB — LAMOTRIGINE SERPL-MCNC: 3.5 UG/ML (ref 2.5–15)

## 2019-11-29 LAB — 10OH-CARBAZEPINE SERPL-MCNC: 7.2 UG/ML (ref 10–35)

## 2019-12-10 ENCOUNTER — APPOINTMENT (OUTPATIENT)
Dept: NEUROLOGY | Facility: CLINIC | Age: 54
End: 2019-12-10
Payer: COMMERCIAL

## 2020-05-29 ENCOUNTER — VIRTUAL VISIT (OUTPATIENT)
Dept: NEUROPSYCHOLOGY | Facility: CLINIC | Age: 55
End: 2020-05-29
Payer: COMMERCIAL

## 2020-05-29 ENCOUNTER — VIRTUAL VISIT (OUTPATIENT)
Dept: NEUROLOGY | Facility: CLINIC | Age: 55
End: 2020-05-29
Payer: COMMERCIAL

## 2020-05-29 DIAGNOSIS — G40.919 EPILEPSY WITH ALTERED CONSCIOUSNESS WITH INTRACTABLE EPILEPSY (H): Primary | ICD-10-CM

## 2020-05-29 DIAGNOSIS — R41.3 MEMORY LOSS: ICD-10-CM

## 2020-05-29 DIAGNOSIS — F33.0 MAJOR DEPRESSIVE DISORDER, RECURRENT EPISODE, MILD (H): ICD-10-CM

## 2020-05-29 DIAGNOSIS — F09 MENTAL DISORDER DUE TO GENERAL MEDICAL CONDITION: ICD-10-CM

## 2020-05-29 DIAGNOSIS — G40.219 PARTIAL SYMPTOMATIC EPILEPSY WITH COMPLEX PARTIAL SEIZURES, INTRACTABLE, WITHOUT STATUS EPILEPTICUS (H): Primary | ICD-10-CM

## 2020-05-29 NOTE — PATIENT INSTRUCTIONS
Check antiepileptic drug for efficacy, toxicity, and side effects.    Encouraged him to work family member to set up a time on phone and watch alarm for medications. Use pill box.    Follow up  With primary care provider for legs swelling   Continue antiepileptic drug Lamotrigine 200-200-100 and Oxcarbazepine 300 mg three times a day   Follow up with sleep doctor  Follow up  4 weeks     Charity Young MD

## 2020-05-29 NOTE — LETTER
"2020     RE: Derrell Salguero  : 1965   MRN: [id number]      Dear Colleague,    Thank you for referring your patient, Derrell Salguero, to the [facility] EPILEPSY CARE at [facility]. Please see a copy of my visit note below.    Derrell Salguero is a 55 year old male who is being evaluated via a billable telephone visit.      The patient has been notified of following:     \"This telephone visit will be conducted via a call between you and your physician/provider. We have found that certain health care needs can be provided without the need for a physical exam.  This service lets us provide the care you need with a short phone conversation.  If a prescription is necessary we can send it directly to your pharmacy.  If lab work is needed we can place an order for that and you can then stop by our lab to have the test done at a later time.    Telephone visits are billed at different rates depending on your insurance coverage. During this emergency period, for some insurers they may be billed the same as an in-person visit.  Please reach out to your insurance provider with any questions.    If during the course of the call the physician/provider feels a telephone visit is not appropriate, you will not be charged for this service.\"    Patient has given verbal consent for Telephone visit?  Yes    What phone number would you like to be contacted at? [phone]    How would you like to obtain your AVS? [email or web address]    Phone call duration: 25 minutes    Charity Young MD    Interval History:   His wife (Aissatou) joined our visit. Seizure described as \"tingling in the head\" this was correlated to EEG seizure. Wife thinks he has 2 seizure per week and has no loss of awareness.  Last seizure was today (2020), he not able to tell me how often this happens. He has no loss of awareness with seizures.    He is not sleeping well, he uses CPAP machine (1 years), the chin straps are " not good, mouth is very dry.   Patient did not fall, is compliant with anti seizure medications, did not have emergency room visit  or did not have any hospitalization . Currently, on antiepileptic drugs there is rare nausea, no fatigue, no upset stomach , no dizziness, no double vision , no mood changes (feelings of depression, irritablity, more argumentative) or no GI issues.  He did not follow up mental health. During inpatient stay psychiatry did see patient and diagnosed panic disorder with social phobia. They recommended trying Hydroxyzine 50 mg p.o. daily for panic. He is not taking hydroxzine as recommended. He does not want to see a psychiatrist.    On this visit we spoke about patient's seizures, antiepileptic drug, and plan of epilepsy are. Patient/caregiver was agreeable with plan of care.        MEDICATIONS:   1.  Lamotrigine 200-200-100      2.  Oxcarbazepine 300 mg three times a day       PAST SEIZURE MEDICATIONS:  Dilantin, Tegretol and Depakote.  Both of these cause side effects in the past.  Phenytoin was discontinued for unclear reason.  Levetiracetam was started in 2015.      SOCIAL HISTORY:  copied from last note: The patient completed [employer] and works as a  on an [employer] line [employer].  He has worked there for 35 years.  He is , has 2 kids, ages 22 and 29.  He had a normal childhood, a good family and no traumatic life experiences.  He does not drink alcohol.  He smokes a few cigarettes a day.  He has a lot of caffeinated beverages per day.  He drinks 7-8 cans of Mountain Dew per day, 2-3 cups of coffee per day and 1 Monster drink every morning.     IMPRESSION:   1.  Focal Epilepsy   2.  Lower extremities swelling   3.  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  March 2019 video EEG admission recorded for seizures arising from the right temporal lobe.   MRI of the brain March 2019 showed hippocampal asymmetry and mild subcortical T2 signal intensities consistent with microvascular subcortical lesions.  No obvious epileptogenic lesion was identified.      Encouraged antiepileptic drug compliance and we will check levels.       PLAN:  Check antiepileptic drug for efficacy, toxicity, and side effects.    Encouraged him to work family member to set up a time on phone and watch alarm for medications. Use pill box.    Follow up  With primary care provider for legs swelling   Continue antiepileptic drug Lamotrigine 200-200-100 and Oxcarbazepine 300 mg three times a day   Follow up with sleep doctor  Follow up  4 weeks     Charity Young MD

## 2020-05-29 NOTE — PROGRESS NOTES
The patient was seen for telephone neuropsychological evaluation at the request of Charity Young   for the purposes of diagnostic clarification and treatment planning.  145 minutes of test administration and scoring were provided by this writer.  Please see Dr. Rafael Clayton's report for a full interpretation of the findings.

## 2020-05-29 NOTE — PROGRESS NOTES
"Derrell Salguero is a 55 year old male who is being evaluated via a billable telephone visit.      The patient has been notified of following:     \"This telephone visit will be conducted via a call between you and your physician/provider. We have found that certain health care needs can be provided without the need for a physical exam.  This service lets us provide the care you need with a short phone conversation.  If a prescription is necessary we can send it directly to your pharmacy.  If lab work is needed we can place an order for that and you can then stop by our lab to have the test done at a later time.    Telephone visits are billed at different rates depending on your insurance coverage. During this emergency period, for some insurers they may be billed the same as an in-person visit.  Please reach out to your insurance provider with any questions.    If during the course of the call the physician/provider feels a telephone visit is not appropriate, you will not be charged for this service.\"    Patient has given verbal consent for Telephone visit?  Yes    What phone number would you like to be contacted at? [phone]    How would you like to obtain your AVS? [email or web address]    Phone call duration: 25 minutes    Charity Young MD    Interval History:   His wife (Aissatou) joined our visit. Seizure described as \"tingling in the head\" this was correlated to EEG seizure. Wife thinks he has 2 seizure per week and has no loss of awareness.  Last seizure was today (5/29/2020), he not able to tell me how often this happens. He has no loss of awareness with seizures.    He is not sleeping well, he uses CPAP machine (1 years), the chin straps are not good, mouth is very dry.   Patient did not fall, is compliant with anti seizure medications, did not have emergency room visit  or did not have any hospitalization . Currently, on antiepileptic drugs there is rare nausea, no fatigue, no upset stomach , no dizziness, no " double vision , no mood changes (feelings of depression, irritablity, more argumentative) or no GI issues.  He did not follow up mental health. During inpatient stay psychiatry did see patient and diagnosed panic disorder with social phobia. They recommended trying Hydroxyzine 50 mg p.o. daily for panic. He is not taking hydroxzine as recommended. He does not want to see a psychiatrist.    On this visit we spoke about patient's seizures, antiepileptic drug, and plan of epilepsy are. Patient/caregiver was agreeable with plan of care.        MEDICATIONS:   1.  Lamotrigine 200-200-100      2.  Oxcarbazepine 300 mg three times a day       PAST SEIZURE MEDICATIONS:  Dilantin, Tegretol and Depakote.  Both of these cause side effects in the past.  Phenytoin was discontinued for unclear reason.  Levetiracetam was started in 2015.      SOCIAL HISTORY:  copied from last note: The patient completed [employer] and works as a  on an [employer] line [email or web address].  He has worked there for 35 years.  He is , has 2 kids, ages 22 and 29.  He had a normal childhood, a good family and no traumatic life experiences.  He does not drink alcohol.  He smokes a few cigarettes a day.  He has a lot of caffeinated beverages per day.  He drinks 7-8 cans of Mountain Dew per day, 2-3 cups of coffee per day and 1 Monster drink every morning.     IMPRESSION:   1.  Focal Epilepsy   2.  Lower extremities swelling   3.  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  March 2019 video EEG admission recorded for seizures arising from the right temporal lobe.  MRI of the brain March 2019 showed hippocampal asymmetry and mild subcortical T2 signal intensities consistent with microvascular subcortical lesions.  No obvious epileptogenic lesion was identified.      Encouraged antiepileptic drug compliance and we will check levels.        PLAN:  Check antiepileptic drug for efficacy, toxicity, and side effects.    Encouraged him to work family member to set up a time on phone and watch alarm for medications. Use pill box.    Follow up  With primary care provider for legs swelling   Continue antiepileptic drug Lamotrigine 200-200-100 and Oxcarbazepine 300 mg three times a day   Follow up with sleep doctor  Follow up  4 weeks     Charity Young MD

## 2020-06-01 NOTE — PROGRESS NOTES
Patient:  Derrell Salguero MRN:  [id number] :  63 GONZALEZ:  20 Age   Education 12 Handedness:   Provider EW Psychometrist:  VO 55   Orientation    WAIS-IV Raw SS RDS    Personal Info 4   Digit Span 26 10 7    Place 2   Vocabulary 21 6 WMI= 105    Time -0   Arithmetic 17 12 VCI = 83    Presidents 5   Similarities 20 8       FAS    Animal Fluency       Raw 30   Raw 18      SS/MAS/z 8   SS/z 9      T 41   T 45               Complex Ideational Material           Raw 12          SS 12          T 58          Oral Trails    TSAT       Trails A 18 Z -11.6 Total time 104.43 Z -0.26    Trails B 60 Z [id number] Total Errors 1 Z 0.52    HVLT            Trial 1 5   T-Score   T-Score    Trial 2 7 Total Recall 17 24 True Positives 10     Trial 3 5 Delayed Recall 5 23 False Positives 4     Learning 0 Percent Retention 71 31 Discrim. Index 6 20    RBANS Story            Total Immediate 12 z Score -1.49 Scaled Score 5      Total Delay 7 z Score -0.95 Scaled Score 7      ILS Health and Safety Questionnaire           Total 34 Classification moderate        PHQ-9           Total Score 5 Interpretation Mild        SIMONE-7           Total Score 2 Interpretation minimal

## 2020-07-02 ENCOUNTER — VIRTUAL VISIT (OUTPATIENT)
Dept: NEUROLOGY | Facility: CLINIC | Age: 55
End: 2020-07-02
Payer: COMMERCIAL

## 2020-07-02 DIAGNOSIS — G40.219 PARTIAL SYMPTOMATIC EPILEPSY WITH COMPLEX PARTIAL SEIZURES, INTRACTABLE, WITHOUT STATUS EPILEPTICUS (H): ICD-10-CM

## 2020-07-02 RX ORDER — OXCARBAZEPINE 150 MG/1
TABLET, FILM COATED ORAL
Qty: 540 TABLET | Refills: 3 | Status: SHIPPED | OUTPATIENT
Start: 2020-07-02 | End: 2021-03-23

## 2020-07-02 RX ORDER — LAMOTRIGINE 100 MG/1
TABLET ORAL
Qty: 450 TABLET | Refills: 3 | Status: SHIPPED | OUTPATIENT
Start: 2020-07-02 | End: 2021-03-23

## 2020-07-02 ASSESSMENT — PATIENT HEALTH QUESTIONNAIRE - PHQ9: SUM OF ALL RESPONSES TO PHQ QUESTIONS 1-9: 0

## 2020-07-02 NOTE — PROGRESS NOTES
"Derrell Salguero is a 55 year old male who is being evaluated via a billable telephone visit.      The patient has been notified of following:     \"This telephone visit will be conducted via a call between you and your physician/provider. We have found that certain health care needs can be provided without the need for a physical exam.  This service lets us provide the care you need with a short phone conversation.  If a prescription is necessary we can send it directly to your pharmacy.  If lab work is needed we can place an order for that and you can then stop by our lab to have the test done at a later time.    Telephone visits are billed at different rates depending on your insurance coverage. During this emergency period, for some insurers they may be billed the same as an in-person visit.  Please reach out to your insurance provider with any questions.    If during the course of the call the physician/provider feels a telephone visit is not appropriate, you will not be charged for this service.\"    Patient has given verbal consent for Telephone visit?  Yes    What phone number would you like to be contacted at? [phone]    How would you like to obtain your AVS? MyChart     Phone call duration: 6 minutes    Charity Young MD      Interval History:   No seizure since last visit.  Seizure described as \"tingling in the head\" this was correlated to EEG seizure.  Last seizure was today (5/29/2020). He has no loss of awareness with seizures.     He feels good, he is sleeping well, he using CPAP. He also has less stress.  Patient did not fall, is compliant with anti seizure medications, did not have emergency room visit  or did not have any hospitalization . Currently, on antiepileptic drugs there is no nausea, no fatigue, no upset stomach , no dizziness, no double vision , no mood changes (feelings of depression, irritablity, more argumentative) or no GI issues.  On this visit we spoke about patient's seizures, " antiepileptic drug, and plan of epilepsy are. Patient/caregiver was agreeable with plan of care.         MEDICATIONS:   1.  Lamotrigine 200-100-200  (am, dinner time, night)    2.  Oxcarbazepine 300 mg three times a day  (am, dinner time, night)       PAST SEIZURE MEDICATIONS:  Dilantin, Tegretol and Depakote.  Both of these cause side effects in the past.  Phenytoin was discontinued for unclear reason.  Levetiracetam was started in 2015.      SOCIAL HISTORY:  copied from last note: The patient completed [employer] and works as a  on an [employer] line [employer].  He has worked there for 35 years.  He is , has 2 kids, ages 22 and 29.  He had a normal childhood, a good family and no traumatic life experiences.  He does not drink alcohol.  He smokes a few cigarettes a day.  He has reduce caffeinated beverages per day.       IMPRESSION:   1.  Focal Epilepsy   2.  Lower extremities swelling   3.  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  March 2019 video EEG admission recorded for seizures arising from the right temporal lobe.  MRI of the brain March 2019 showed hippocampal asymmetry and mild subcortical T2 signal intensities consistent with microvascular subcortical lesions.  No obvious epileptogenic lesion was identified.       Encouraged antiepileptic drug compliance and we will check levels.         PLAN:  Check antiepileptic drug for efficacy, toxicity, and side effects.    Continue antiepileptic drug Lamotrigine 200-100-200 and Oxcarbazepine 300 mg three times a day   Follow up with sleep doctor  Follow up  6 months      Charity Young MD

## 2020-07-02 NOTE — LETTER
"2020     RE: Derrell Salguero  : 1965   MRN: [id number]      Dear Colleague,    Thank you for referring your patient, Derrell Salguero, to the [facility] EPILEPSY CARE at [facility]. Please see a copy of my visit note below.    Derrell Salguero is a 55 year old male who is being evaluated via a billable telephone visit.      Charity Young MD    Interval History:   No seizure since last visit.  Seizure described as \"tingling in the head\" this was correlated to EEG seizure.  Last seizure was today (2020). He has no loss of awareness with seizures.     He feels good, he is sleeping well, he using CPAP. He also has less stress.  Patient did not fall, is compliant with anti seizure medications, did not have emergency room visit  or did not have any hospitalization . Currently, on antiepileptic drugs there is no nausea, no fatigue, no upset stomach , no dizziness, no double vision , no mood changes (feelings of depression, irritablity, more argumentative) or no GI issues.  On this visit we spoke about patient's seizures, antiepileptic drug, and plan of epilepsy are. Patient/caregiver was agreeable with plan of care.         MEDICATIONS:   1.  Lamotrigine 200-100-200  (am, dinner time, night)    2.  Oxcarbazepine 300 mg three times a day  (am, dinner time, night)       PAST SEIZURE MEDICATIONS:  Dilantin, Tegretol and Depakote.  Both of these cause side effects in the past.  Phenytoin was discontinued for unclear reason.  Levetiracetam was started in .      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  He is , has 2 kids, ages 22 and 29.  He had a normal childhood, a good family and no traumatic life experiences.  He does not drink alcohol.  He smokes a few cigarettes a day.  He has reduce caffeinated beverages per " day.       IMPRESSION:   1.  Focal Epilepsy   2.  Lower extremities swelling   3.  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  March 2019 video EEG admission recorded for seizures arising from the right temporal lobe.  MRI of the brain March 2019 showed hippocampal asymmetry and mild subcortical T2 signal intensities consistent with microvascular subcortical lesions.  No obvious epileptogenic lesion was identified.       Encouraged antiepileptic drug compliance and we will check levels.       PLAN:  Check antiepileptic drug for efficacy, toxicity, and side effects.    Continue antiepileptic drug Lamotrigine 200-100-200 and Oxcarbazepine 300 mg three times a day   Follow up with sleep doctor  Follow up  6 months      Charity Young MD

## 2020-11-05 NOTE — LETTER
Internal Medicine Patient:  Derrell Salguero  :   1965  MRN:     [id number]        Mr.Vincent Casimiro Salguero  [address] [address] [address]        January 3, 2020    Dear ,    We are writing to inform you of your test results.    Your test results fall within the expected range(s) or remain unchanged from previous results.  Your oxcarbazepine metabolite blood concentration is lower compared to last check.  If you have more seizures let us know and we can increase your dose.  Please continue with current treatment plan.    Resulted Orders   Oxcarbazepine level   Result Value Ref Range    10 Hydroxy Metabolite Level 7.2 (L) 10.0 - 35.0 ug/ml      Comment:      (Note)             Therapeutic efficacy has been demonstrated in             patients with trough 10-hydroxy metabolite             concentrations of 10.0 - 35.0 ug/ml.             Toxic concentrations have not been established.  Analysis performed by [email or web address], [employer] [place] [address]     Comprehensive metabolic panel   Result Value Ref Range    Sodium 142 133 - 144 mmol/L    Potassium 5.0 3.4 - 5.3 mmol/L    Chloride 106 94 - 109 mmol/L    Carbon Dioxide 30 20 - 32 mmol/L    Anion Gap 5 3 - 14 mmol/L    Glucose 70 70 - 99 mg/dL    Urea Nitrogen 29 7 - 30 mg/dL    Creatinine 1.35 (H) 0.66 - 1.25 mg/dL    GFR Estimate 58 (L) >60 mL/min/[1.73_m2]      Comment:      Non  GFR Calc  Starting 2018, serum creatinine based estimated GFR (eGFR) will be   calculated using the Chronic Kidney Disease Epidemiology Collaboration   (CKD-EPI) equation.      GFR Estimate If Black 68 >60 mL/min/[1.73_m2]      Comment:       GFR Calc  Starting 2018, serum creatinine based estimated GFR (eGFR) will be   calculated using the Chronic Kidney Disease Epidemiology Collaboration   (CKD-EPI) equation.      Calcium 10.3 (H) 8.5 - 10.1 mg/dL    Bilirubin Total 0.4 0.2 - 1.3 mg/dL    Albumin 4.3 3.4 - 5.0 g/dL     Protein Total 7.0 6.8 - 8.8 g/dL    Alkaline Phosphatase 138 40 - 150 U/L    ALT 26 0 - 70 U/L    AST 10 0 - 45 U/L   CBC with platelets differential   Result Value Ref Range    WBC 6.6 4.0 - 11.0 10e9/L    RBC Count 5.18 4.4 - 5.9 10e12/L    Hemoglobin 15.2 13.3 - 17.7 g/dL    Hematocrit 48.7 40.0 - 53.0 %    MCV 94 78 - 100 fl    MCH 29.3 26.5 - 33.0 pg    MCHC 31.2 (L) 31.5 - 36.5 g/dL    RDW 12.8 10.0 - 15.0 %    Platelet Count 307 150 - 450 10e9/L    Diff Method Automated Method     % Neutrophils 57.2 %    % Lymphocytes 31.8 %    % Monocytes 7.3 %    % Eosinophils 2.9 %    % Basophils 0.5 %    % Immature Granulocytes 0.3 %    Nucleated RBCs 0 0 /100    Absolute Neutrophil 3.8 1.6 - 8.3 10e9/L    Absolute Lymphocytes 2.1 0.8 - 5.3 10e9/L    Absolute Monocytes 0.5 0.0 - 1.3 10e9/L    Absolute Eosinophils 0.2 0.0 - 0.7 10e9/L    Absolute Basophils 0.0 0.0 - 0.2 10e9/L    Abs Immature Granulocytes 0.0 0 - 0.4 10e9/L    Absolute Nucleated RBC 0.0        Charity Young MD         [id number]  1965

## 2020-11-14 ENCOUNTER — HEALTH MAINTENANCE LETTER (OUTPATIENT)
Age: 55
End: 2020-11-14

## 2021-03-23 ENCOUNTER — VIRTUAL VISIT (OUTPATIENT)
Dept: NEUROLOGY | Facility: CLINIC | Age: 56
End: 2021-03-23
Payer: COMMERCIAL

## 2021-03-23 DIAGNOSIS — G40.219 PARTIAL SYMPTOMATIC EPILEPSY WITH COMPLEX PARTIAL SEIZURES, INTRACTABLE, WITHOUT STATUS EPILEPTICUS (H): ICD-10-CM

## 2021-03-23 RX ORDER — OXCARBAZEPINE 150 MG/1
TABLET, FILM COATED ORAL
Qty: 540 TABLET | Refills: 3 | Status: SHIPPED | OUTPATIENT
Start: 2021-03-23 | End: 2022-03-28

## 2021-03-23 RX ORDER — MESALAMINE 800 MG/1
1 TABLET, DELAYED RELEASE ORAL 2 TIMES DAILY
COMMUNITY
Start: 2021-03-14

## 2021-03-23 RX ORDER — HYDROCHLOROTHIAZIDE 12.5 MG/1
CAPSULE ORAL
COMMUNITY
Start: 2021-03-10

## 2021-03-23 RX ORDER — LAMOTRIGINE 100 MG/1
TABLET ORAL
Qty: 495 TABLET | Refills: 3 | Status: SHIPPED | OUTPATIENT
Start: 2021-03-23

## 2021-03-23 ASSESSMENT — PATIENT HEALTH QUESTIONNAIRE - PHQ9: SUM OF ALL RESPONSES TO PHQ QUESTIONS 1-9: 3

## 2021-03-23 NOTE — PROGRESS NOTES
Depression Response    Patient completed the PHQ-9 assessment for depression and scored >9? No  Question 9 on the PHQ-9 was positive for suicidality? No  Does patient have current mental health provider? No    Is this a virtual visit? No    I personally notified the following: visit provider

## 2021-03-23 NOTE — LETTER
"3/23/2021       RE: Derrell Salguero  : 1965   MRN: [id number]      Dear Colleague,    Thank you for referring your patient, Derrell Salguero, to the [facility] EPILEPSY CARE at [facility]. Please see a copy of my visit note below.    Left 2 messages to call back for rooming process before telephone appointment today.       Derrell is a 55 year old who is being evaluated via a billable telephone visit.      Visit was transitioned to a video visit.  Patient is agreeable to this.    Depression Response    Patient completed the PHQ-9 assessment for depression and scored >9? No  Question 9 on the PHQ-9 was positive for suicidality? No  Does patient have current mental health provider? No    Is this a virtual visit? No    I personally notified the following: visit provider               Video-Visit Details  Patient consented to this visit   Patient location: home  Physician location: home    Type of service:  Video Visit    Video Start Time: 8:31 AM    Video End Time:9:00 AM    Originating Location (pt. Location): Home      Interval History:   Focal seizure with no impairment in awareness described as \"tingling in the head\" this was correlated to EEG seizure documented.  He has focal seizure with no impairment in awareness  2 times per day. Age 18 he had generalized tonic-clonic convulsion. He has no loss of awareness with seizures. He has kidney stones, started hydrochlorothiazide,started mesalamine, he does not drink, he drinks 3 cups and 6 cans pop per day.      He is not able to use CPAP because he has dry mouth, he usually sleeps 2 hour with the CPAP.   He goes to bed at 10 pm and wakes up at 6:30am. Now he wakes up 4:30 am, when he works he has 10 hours days. He is now laid off. He falls asleep most of time.     He is compliant with anti seizure medications, had ER visit for vertigo 2020 for one day (MRI brain was negative for stroke). He had did not have any " hospitalization . Currently, on antiepileptic drugs there is no nausea, no fatigue, no upset stomach , no dizziness, no double vision , no mood changes (feelings of depression, irritablity, more argumentative) or no GI issues.  On this visit we spoke about patient's seizures, antiepileptic drug, and plan of epilepsy are. Patient/caregiver was agreeable with plan of care.         MEDICATIONS:   1.  Lamotrigine 200-100-200  (am, dinner time, night)    2.  Oxcarbazepine 300 mg three times a day  (am, dinner time, night)       Component      Latest Ref Rng & Units 3/21/2019 4/19/2019 11/26/2019   Lamotrigine Level      2.5 - 15.0 ug/mL 6.3  3.5   10 Hydroxy Metabolite Level      10.0 - 35.0 ug/ml  15.9 7.2 (L)       PAST SEIZURE MEDICATIONS:  Dilantin, Tegretol and Depakote.  Both of these cause side effects in the past.  Phenytoin was discontinued for unclear reason.  Levetiracetam was started in 2015.      SOCIAL HISTORY: The patient completed [employer].  He is currently laid off and not working.  he is , has 2 adult kids.  He had a normal childhood, a good family and no traumatic life experiences.  He does not drink alcohol.  He no longer smokes.  He has 3 cups of coffee and 6 cans of pop per day.      Limited video exam : alert, orientated, speech is fluent, face symmetric, tongue midline, extra ocular movements in tact, no pronator drip, finger to nose normal.       IMPRESSION:   1.  Focal Epilepsy   2.  Memory loss, multifactorial (epilepsy, depression, medications, poor sleep)     3. Kidney stones  4. HTN  5. 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  March 2019 video EEG admission recorded for seizures arising from the right temporal lobe.  MRI of the brain March 2019 showed hippocampal asymmetry and mild subcortical T2 signal intensities consistent with microvascular subcortical lesions.  No obvious epileptogenic  "lesion was identified.  He has increase seizure burden due to poor sleep and excessive caffeine intake (3 cups of coffee per day and 6 cans of pop).  I have encouraged him to talk to sleep doctor to improve sleep.  Additionally encouraged him to drink less caffeine during the day.  We will increase lamotrigine.  However, he may have side effects to seizure medications.  We will check seizure medication levels.        PLAN:  Check antiepileptic drug for efficacy, toxicity, and side effects.    Continue antiepileptic drug Oxcarbazepine 300 mg three times a day   Increase Lamotrigine 200-150-200  (am, dinner time, night)      Follow up with sleep doctor for CPAP issues and primary care provider for kidney issues  Follow up  3 months      I spent 36 minutes for patient's medical care. During this time key medical decisions were made with review of medical chart prior to visit, visit with patient, counseling/education, and post visit work, including documentation on the day of visit. I addressed all questions the patient/caregiver raised in regards to the patient's medical care. This note was created with voice recognition software. Inadvertent grammatical errors, typographical errors, and \"sound a like\" substitutions may occur due to limitations of the software.  Read the note carefully and apply context when erroneous substitutions have occurred. Thank you.       Charity Young MD     "

## 2021-03-23 NOTE — PROGRESS NOTES
"Video-Visit Details  Patient consented to this visit   Patient location: home  Physician location: home    Type of service:  Video Visit    Video Start Time: 8:31 AM    Video End Time:9:00 AM    Originating Location (pt. Location): Home      Interval History:   Focal seizure with no impairment in awareness described as \"tingling in the head\" this was correlated to EEG seizure documented.  He has focal seizure with no impairment in awareness  2 times per day. Age 18 he had generalized tonic-clonic convulsion. He has no loss of awareness with seizures. He has kidney stones, started hydrochlorothiazide,started mesalamine, he does not drink, he drinks 3 cups and 6 cans pop per day.      He is not able to use CPAP because he has dry mouth, he usually sleeps 2 hour with the CPAP.   He goes to bed at 10 pm and wakes up at 6:30am. Now he wakes up 4:30 am, when he works he has 10 hours days. He is now laid off. He falls asleep most of time.     He is compliant with anti seizure medications, had ER visit for vertigo 8/2020 for one day (MRI brain was negative for stroke). He had did not have any hospitalization . Currently, on antiepileptic drugs there is no nausea, no fatigue, no upset stomach , no dizziness, no double vision , no mood changes (feelings of depression, irritablity, more argumentative) or no GI issues.  On this visit we spoke about patient's seizures, antiepileptic drug, and plan of epilepsy are. Patient/caregiver was agreeable with plan of care.         MEDICATIONS:   1.  Lamotrigine 200-100-200  (am, dinner time, night)    2.  Oxcarbazepine 300 mg three times a day  (am, dinner time, night)       Component      Latest Ref Rng & Units 3/21/2019 4/19/2019 11/26/2019   Lamotrigine Level      2.5 - 15.0 ug/mL 6.3  3.5   10 Hydroxy Metabolite Level      10.0 - 35.0 ug/ml  15.9 7.2 (L)       PAST SEIZURE MEDICATIONS:  Dilantin, Tegretol and Depakote.  Both of these cause side effects in the past.  Phenytoin was " discontinued for unclear reason.  Levetiracetam was started in 2015.      SOCIAL HISTORY: The patient completed [employer] school.  He is currently laid off and not working.  he is , has 2 adult kids.  He had a normal childhood, a good family and no traumatic life experiences.  He does not drink alcohol.  He no longer smokes.  He has 3 cups of coffee and 6 cans of pop per day.      Limited video exam : alert, orientated, speech is fluent, face symmetric, tongue midline, extra ocular movements in tact, no pronator drip, finger to nose normal.       IMPRESSION:   1.  Focal Epilepsy   2.  Memory loss, multifactorial (epilepsy, depression, medications, poor sleep)     3. Kidney stones  4. HTN  5. 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  March 2019 video EEG admission recorded for seizures arising from the right temporal lobe.  MRI of the brain March 2019 showed hippocampal asymmetry and mild subcortical T2 signal intensities consistent with microvascular subcortical lesions.  No obvious epileptogenic lesion was identified.  He has increase seizure burden due to poor sleep and excessive caffeine intake (3 cups of coffee per day and 6 cans of pop).  I have encouraged him to talk to sleep doctor to improve sleep.  Additionally encouraged him to drink less caffeine during the day.  We will increase lamotrigine.  However, he may have side effects to seizure medications.  We will check seizure medication levels.        PLAN:  Check antiepileptic drug for efficacy, toxicity, and side effects.    Continue antiepileptic drug Oxcarbazepine 300 mg three times a day   Increase Lamotrigine 200-150-200  (am, dinner time, night)      Follow up with sleep doctor for CPAP issues and primary care provider for kidney issues  Follow up  3 months      I spent 36 minutes for patient's medical care. During this time key medical decisions were made with  "review of medical chart prior to visit, visit with patient, counseling/education, and post visit work, including documentation on the day of visit. I addressed all questions the patient/caregiver raised in regards to the patient's medical care. This note was created with voice recognition software. Inadvertent grammatical errors, typographical errors, and \"sound a like\" substitutions may occur due to limitations of the software.  Read the note carefully and apply context when erroneous substitutions have occurred. Thank you.       Charity Young MD   "

## 2021-03-23 NOTE — PROGRESS NOTES
Derrell is a 55 year old who is being evaluated via a billable telephone visit.      Visit was transitioned to a video visit.  Patient is agreeable to this.

## 2021-03-23 NOTE — PATIENT INSTRUCTIONS
Get blood lab near home    Continue antiepileptic drug Oxcarbazepine 300 mg three times a day   Increase Lamotrigine 200 mg morning, 150 mg noon, and 200 mg evening   Follow up with sleep doctor for CPAP/sleep doctor about issues and primary care provider for kidney issues  If you are not sleeping well at night, this will make you tired in the day. Then you drink more coffee/pop. Poor sleep and too much coffee/pop can increase seizures. The root cause is poor sleep.   Follow up  3 months    Charity Young MD

## 2021-09-12 ENCOUNTER — HEALTH MAINTENANCE LETTER (OUTPATIENT)
Age: 56
End: 2021-09-12

## 2022-01-02 ENCOUNTER — HEALTH MAINTENANCE LETTER (OUTPATIENT)
Age: 57
End: 2022-01-02

## 2022-03-24 DIAGNOSIS — G40.219 PARTIAL SYMPTOMATIC EPILEPSY WITH COMPLEX PARTIAL SEIZURES, INTRACTABLE, WITHOUT STATUS EPILEPTICUS (H): ICD-10-CM

## 2022-03-28 RX ORDER — OXCARBAZEPINE 150 MG/1
TABLET, FILM COATED ORAL
Qty: 180 TABLET | Refills: 0 | Status: SHIPPED | OUTPATIENT
Start: 2022-03-28 | End: 2022-05-25

## 2022-03-28 NOTE — TELEPHONE ENCOUNTER
LCV: 3/23/2021  [facility]  outside lab 12-21-20 Na  Scheduling has been notified to contact the pt for appointment.

## 2022-05-21 DIAGNOSIS — G40.219 PARTIAL SYMPTOMATIC EPILEPSY WITH COMPLEX PARTIAL SEIZURES, INTRACTABLE, WITHOUT STATUS EPILEPTICUS (H): ICD-10-CM

## 2022-05-25 RX ORDER — OXCARBAZEPINE 150 MG/1
TABLET, FILM COATED ORAL
Qty: 180 TABLET | Refills: 1 | Status: SHIPPED | OUTPATIENT
Start: 2022-05-25 | End: 2022-08-17

## 2022-05-25 NOTE — TELEPHONE ENCOUNTER
OXcarbazepine (TRILEPTAL) 150 MG tablet   TAKE 2 TABLETS BY MOUTH THREE TIMES DAILY     Last Written Prescription Date:  3/28/22  Last Fill Quantity: 180,   # refills: 0  Last Office Visit : 3/23/21  Future Office visit:  none    Routing refill request to provider for review/approval because:  Overdue visit > 14 months. Past due sodium . Med pended.  Scheduling has been notified to contact the pt for appointment.

## 2022-08-13 DIAGNOSIS — G40.219 PARTIAL SYMPTOMATIC EPILEPSY WITH COMPLEX PARTIAL SEIZURES, INTRACTABLE, WITHOUT STATUS EPILEPTICUS (H): ICD-10-CM

## 2022-08-17 RX ORDER — OXCARBAZEPINE 150 MG/1
TABLET, FILM COATED ORAL
Qty: 180 TABLET | Refills: 1 | Status: SHIPPED | OUTPATIENT
Start: 2022-08-17 | End: 2022-11-28

## 2022-08-17 NOTE — TELEPHONE ENCOUNTER
OXcarbazepine 150 MG Oral Tablet      Last Written Prescription Date:  5/25/22  Last Fill Quantity: 180,   # refills: 1  Last Office Visit : 3/23/21 recommended 3 month follow up  Future Office visit:  None scheduled    Routing refill request to provider for review/approval because:  Most recent sodium 12/21/20 in care everywhere  Sodium 136 - 145 meq/L 140      Lapsed future orders in queue

## 2022-11-19 ENCOUNTER — HEALTH MAINTENANCE LETTER (OUTPATIENT)
Age: 57
End: 2022-11-19

## 2022-11-20 DIAGNOSIS — G40.219 PARTIAL SYMPTOMATIC EPILEPSY WITH COMPLEX PARTIAL SEIZURES, INTRACTABLE, WITHOUT STATUS EPILEPTICUS (H): ICD-10-CM

## 2022-11-28 RX ORDER — OXCARBAZEPINE 150 MG/1
TABLET, FILM COATED ORAL
Qty: 180 TABLET | Refills: 1 | Status: SHIPPED | OUTPATIENT
Start: 2022-11-28

## 2022-11-28 NOTE — TELEPHONE ENCOUNTER
OXcarbazepine 150 MG Oral Tablet      Last Written Prescription Date:  8-17-22  Last Fill Quantity: 180,   # refills: 1  Last Office Visit : 3-23-21  Future Office visit:  none      12-21-20 outside lab:  Sodium 136 - 145 meq/L 140        Routing refill request to provider for review/approval because:  Past due appt- last Rx christina [facility]  Past due lab: Na  --FYI to clinic RN    Scheduling has been notified to contact the pt for appointment.

## 2023-04-09 ENCOUNTER — HEALTH MAINTENANCE LETTER (OUTPATIENT)
Age: 58
End: 2023-04-09

## 2024-06-15 ENCOUNTER — HEALTH MAINTENANCE LETTER (OUTPATIENT)
Age: 59
End: 2024-06-15